# Patient Record
Sex: FEMALE | Race: WHITE | NOT HISPANIC OR LATINO | URBAN - METROPOLITAN AREA
[De-identification: names, ages, dates, MRNs, and addresses within clinical notes are randomized per-mention and may not be internally consistent; named-entity substitution may affect disease eponyms.]

---

## 2024-04-11 ENCOUNTER — HOSPITAL ENCOUNTER (OUTPATIENT)
Dept: RADIOLOGY | Facility: HOSPITAL | Age: 44
Discharge: HOME | End: 2024-04-11
Payer: COMMERCIAL

## 2024-04-11 DIAGNOSIS — Z12.31 ENCOUNTER FOR SCREENING MAMMOGRAM FOR BREAST CANCER: ICD-10-CM

## 2024-04-11 PROCEDURE — 77063 BREAST TOMOSYNTHESIS BI: CPT | Mod: TC

## 2024-04-11 PROCEDURE — 77063 BREAST TOMOSYNTHESIS BI: CPT | Mod: 26,,, | Performed by: RADIOLOGY

## 2024-04-11 PROCEDURE — 77067 SCR MAMMO BI INCL CAD: CPT | Mod: 26,,, | Performed by: RADIOLOGY

## 2024-05-29 ENCOUNTER — OFFICE VISIT (OUTPATIENT)
Dept: PRIMARY CARE CLINIC | Facility: CLINIC | Age: 44
End: 2024-05-29
Payer: COMMERCIAL

## 2024-05-29 VITALS
OXYGEN SATURATION: 99 % | RESPIRATION RATE: 18 BRPM | HEART RATE: 107 BPM | WEIGHT: 292.44 LBS | HEIGHT: 64 IN | SYSTOLIC BLOOD PRESSURE: 126 MMHG | DIASTOLIC BLOOD PRESSURE: 85 MMHG | BODY MASS INDEX: 49.93 KG/M2

## 2024-05-29 DIAGNOSIS — Z11.59 NEED FOR HEPATITIS C SCREENING TEST: ICD-10-CM

## 2024-05-29 DIAGNOSIS — Z76.89 ENCOUNTER TO ESTABLISH CARE: Primary | ICD-10-CM

## 2024-05-29 DIAGNOSIS — R35.89 POLYURIA: ICD-10-CM

## 2024-05-29 DIAGNOSIS — Z13.1 DIABETES MELLITUS SCREENING: ICD-10-CM

## 2024-05-29 DIAGNOSIS — Z11.4 ENCOUNTER FOR SCREENING FOR HIV: ICD-10-CM

## 2024-05-29 DIAGNOSIS — R06.83 SNORING: ICD-10-CM

## 2024-05-29 DIAGNOSIS — Z23 NEED FOR VACCINATION: ICD-10-CM

## 2024-05-29 DIAGNOSIS — Z51.81 MEDICATION MONITORING ENCOUNTER: ICD-10-CM

## 2024-05-29 DIAGNOSIS — R53.83 FATIGUE, UNSPECIFIED TYPE: ICD-10-CM

## 2024-05-29 DIAGNOSIS — Z13.6 ENCOUNTER FOR SCREENING FOR CARDIOVASCULAR DISORDERS: ICD-10-CM

## 2024-05-29 DIAGNOSIS — Z00.00 WELL WOMAN EXAM WITHOUT GYNECOLOGICAL EXAM: ICD-10-CM

## 2024-05-29 DIAGNOSIS — E55.9 VITAMIN D DEFICIENCY: ICD-10-CM

## 2024-05-29 PROCEDURE — 3074F SYST BP LT 130 MM HG: CPT | Mod: CPTII,S$GLB,, | Performed by: STUDENT IN AN ORGANIZED HEALTH CARE EDUCATION/TRAINING PROGRAM

## 2024-05-29 PROCEDURE — 90471 IMMUNIZATION ADMIN: CPT | Mod: S$GLB,,, | Performed by: STUDENT IN AN ORGANIZED HEALTH CARE EDUCATION/TRAINING PROGRAM

## 2024-05-29 PROCEDURE — 1160F RVW MEDS BY RX/DR IN RCRD: CPT | Mod: CPTII,S$GLB,, | Performed by: STUDENT IN AN ORGANIZED HEALTH CARE EDUCATION/TRAINING PROGRAM

## 2024-05-29 PROCEDURE — 3079F DIAST BP 80-89 MM HG: CPT | Mod: CPTII,S$GLB,, | Performed by: STUDENT IN AN ORGANIZED HEALTH CARE EDUCATION/TRAINING PROGRAM

## 2024-05-29 PROCEDURE — 99999 PR PBB SHADOW E&M-EST. PATIENT-LVL V: CPT | Mod: PBBFAC,,, | Performed by: STUDENT IN AN ORGANIZED HEALTH CARE EDUCATION/TRAINING PROGRAM

## 2024-05-29 PROCEDURE — 3008F BODY MASS INDEX DOCD: CPT | Mod: CPTII,S$GLB,, | Performed by: STUDENT IN AN ORGANIZED HEALTH CARE EDUCATION/TRAINING PROGRAM

## 2024-05-29 PROCEDURE — 90715 TDAP VACCINE 7 YRS/> IM: CPT | Mod: S$GLB,,, | Performed by: STUDENT IN AN ORGANIZED HEALTH CARE EDUCATION/TRAINING PROGRAM

## 2024-05-29 PROCEDURE — 99204 OFFICE O/P NEW MOD 45 MIN: CPT | Mod: 25,S$GLB,, | Performed by: STUDENT IN AN ORGANIZED HEALTH CARE EDUCATION/TRAINING PROGRAM

## 2024-05-29 PROCEDURE — 1159F MED LIST DOCD IN RCRD: CPT | Mod: CPTII,S$GLB,, | Performed by: STUDENT IN AN ORGANIZED HEALTH CARE EDUCATION/TRAINING PROGRAM

## 2024-05-29 NOTE — PROGRESS NOTES
Subjective:       Patient ID: Tona Zelaya is a 43 y.o. female.    Chief Complaint: Establish Care    HPI:  43 y.o. female presents to [facility] to establish care    Acute concerns?: Patient reports works as  and over past year was sick more often than usual. Also suffering with fatigue.    Onset?: ~ 1 year ago, when had first bout of PNA  Progression?: Persistent, waxes and wanes  Good hydration?: Yes  Sleep?: Broken, does have to urinate frequently, has been urinating frequently  Appetite?: Has been ok  Weight loss?: No  New Medications?: No  History of fatigue?: No    Patient feels she may snore when sleeping. Never told stops breathing. Never doses during day.        5/29/2024     3:27 PM   EPWORTH SLEEPINESS SCALE   Sitting and reading 2   Watching TV 2   Sitting, inactive in a public place (e.g. a theatre or a meeting) 1   As a passenger in a car for an hour without a break 3   Lying down to rest in the afternoon when circumstances permit 2   Sitting and talking to someone 0   Sitting quietly after a lunch without alcohol 1   In a car, while stopped for a few minutes in traffic 2   Total score 13         Last PCP?: Unknown  Allergies: NKDA  Medical History: None  Medications: None  Surgical History: None  Family History: Mother with breast cancer (unknown marker status); no known autoimmune disease  Social History: Never smoker, no EtOH, no illicits    Fasting?: No  Last blood work?: 2 years ago  Hep C/HIV screening?: Amenable  Pap Hx?: 2 years, unremarkable  Last tetanus vaccine?: Amenable    Review of Systems   Constitutional:  Positive for fatigue. Negative for appetite change, chills, diaphoresis, fever and unexpected weight change.   HENT:  Negative for congestion, sinus pressure, sneezing and sore throat.    Respiratory:  Negative for cough and shortness of breath.    Cardiovascular:  Negative for chest pain and palpitations.   Gastrointestinal:  Negative for abdominal pain,  "diarrhea, nausea and vomiting.   Endocrine: Positive for cold intolerance, polydipsia and polyuria. Negative for heat intolerance.   Musculoskeletal:  Negative for arthralgias, joint swelling and myalgias.   Skin:  Negative for rash and wound.   Neurological:  Negative for dizziness, light-headedness and headaches.       Objective:      Vitals:    05/29/24 1509   BP: 126/85   BP Location: Right arm   Patient Position: Sitting   BP Method: Large (Automatic)   Pulse: 107   Resp: 18   SpO2: 99%   Weight: 132.6 kg (292 lb 7 oz)   Height: 5' 4" (1.626 m)     Physical Exam  Vitals reviewed.   Constitutional:       General: She is not in acute distress.     Appearance: Normal appearance. She is not ill-appearing.   HENT:      Head: Normocephalic and atraumatic.   Eyes:      General:         Right eye: No discharge.         Left eye: No discharge.      Conjunctiva/sclera: Conjunctivae normal.   Neck:      Thyroid: No thyroid mass, thyromegaly or thyroid tenderness.   Cardiovascular:      Rate and Rhythm: Normal rate and regular rhythm.      Pulses: Normal pulses.      Heart sounds: Normal heart sounds. No murmur heard.  Pulmonary:      Effort: Pulmonary effort is normal.      Breath sounds: Normal breath sounds.   Musculoskeletal:         General: No deformity.      Cervical back: Neck supple. No rigidity.   Lymphadenopathy:      Cervical: No cervical adenopathy.   Skin:     General: Skin is warm and dry.      Coloration: Skin is not jaundiced.   Neurological:      General: No focal deficit present.      Mental Status: She is alert and oriented to person, place, and time.   Psychiatric:         Mood and Affect: Mood normal.         Behavior: Behavior normal.             No results found for: "NA", "K", "CL", "CO2", "BUN", "CREATININE", "GLUCOSE", "ANIONGAP"  No results found for: "HGBA1C"  No results found for: "BNP", "BNPTRIAGEBLO"    No results found for: "WBC", "HGB", "HCT", "PLT", "GRAN"  No results found for: "CHOL", " ""HDL", "LDLCALC", "TRIG"     No current outpatient medications on file.        Assessment:       1. Encounter to establish care    2. Medication monitoring encounter    3. Encounter for screening for cardiovascular disorders    4. Diabetes mellitus screening    5. Polyuria    6. Fatigue, unspecified type    7. Snoring    8. Vitamin D deficiency    9. Need for hepatitis C screening test    10. Encounter for screening for HIV    11. Well woman exam without gynecological exam    12. Need for vaccination           Plan:       Encounter to establish care  Medication monitoring encounter  -     CBC Auto Differential; Future; Expected date: 05/29/2024  -     Comprehensive Metabolic Panel; Future; Expected date: 05/29/2024    Encounter for screening for cardiovascular disorders  -     Lipid Panel; Future; Expected date: 05/29/2024    Diabetes mellitus screening  Polyuria  -     URINALYSIS; Future; Expected date: 05/29/2024  -     Hemoglobin A1C; Future; Expected date: 05/29/2024    Fatigue, unspecified type  Snoring  -     Lactate dehydrogenase (LDH); Future; Expected date: 05/29/2024  -     Uric acid; Future; Expected date: 05/29/2024  -     Sedimentation rate; Future; Expected date: 05/29/2024  -     TSH; Future; Expected date: 05/29/2024  -     T4, free; Future; Expected date: 05/29/2024  -     Ambulatory referral/consult to Sleep Disorders; Future; Expected date: 06/05/2024  - Hundred sleepiness scale 13. Will refer to Sleep Medicine for further evaluation  - Will order blood work to evaluate for possible organic cause    Vitamin D deficiency  -     Vitamin D; Future; Expected date: 05/29/2024    Need for hepatitis C screening test  -     Hepatitis C Antibody; Future; Expected date: 05/29/2024    Encounter for screening for HIV  -     HIV 1/2 Ag/Ab (4th Gen); Future; Expected date: 05/29/2024    Well woman exam without gynecological exam  -     Ambulatory referral/consult to Gynecology; Future; Expected date: " 06/05/2024    Need for vaccination  -     (In Office Administered) Tdap Vaccine    Recurrent illnesses possibly associated with NORA or elevated glucose. Awaiting labs  RTC PRN

## 2024-08-19 ENCOUNTER — OFFICE VISIT (OUTPATIENT)
Dept: SLEEP MEDICINE | Facility: CLINIC | Age: 44
End: 2024-08-19
Payer: COMMERCIAL

## 2024-08-19 VITALS
WEIGHT: 293 LBS | HEART RATE: 85 BPM | SYSTOLIC BLOOD PRESSURE: 155 MMHG | DIASTOLIC BLOOD PRESSURE: 92 MMHG | HEIGHT: 64 IN | BODY MASS INDEX: 50.02 KG/M2

## 2024-08-19 DIAGNOSIS — E55.9 VITAMIN D DEFICIENCY: ICD-10-CM

## 2024-08-19 DIAGNOSIS — G47.10 HYPERSOMNOLENCE: Primary | ICD-10-CM

## 2024-08-19 DIAGNOSIS — R53.83 FATIGUE, UNSPECIFIED TYPE: ICD-10-CM

## 2024-08-19 DIAGNOSIS — R06.83 SNORING: ICD-10-CM

## 2024-08-19 DIAGNOSIS — R35.1 NOCTURIA: ICD-10-CM

## 2024-08-19 DIAGNOSIS — F51.09 OTHER INSOMNIA NOT DUE TO A SUBSTANCE OR KNOWN PHYSIOLOGICAL CONDITION: ICD-10-CM

## 2024-08-19 PROCEDURE — 1159F MED LIST DOCD IN RCRD: CPT | Mod: CPTII,S$GLB,, | Performed by: PHYSICIAN ASSISTANT

## 2024-08-19 PROCEDURE — 99999 PR PBB SHADOW E&M-EST. PATIENT-LVL III: CPT | Mod: PBBFAC,,, | Performed by: PHYSICIAN ASSISTANT

## 2024-08-19 PROCEDURE — 1160F RVW MEDS BY RX/DR IN RCRD: CPT | Mod: CPTII,S$GLB,, | Performed by: PHYSICIAN ASSISTANT

## 2024-08-19 PROCEDURE — 99204 OFFICE O/P NEW MOD 45 MIN: CPT | Mod: S$GLB,,, | Performed by: PHYSICIAN ASSISTANT

## 2024-08-19 PROCEDURE — 3077F SYST BP >= 140 MM HG: CPT | Mod: CPTII,S$GLB,, | Performed by: PHYSICIAN ASSISTANT

## 2024-08-19 PROCEDURE — 3008F BODY MASS INDEX DOCD: CPT | Mod: CPTII,S$GLB,, | Performed by: PHYSICIAN ASSISTANT

## 2024-08-19 PROCEDURE — 3080F DIAST BP >= 90 MM HG: CPT | Mod: CPTII,S$GLB,, | Performed by: PHYSICIAN ASSISTANT

## 2024-08-19 PROCEDURE — 3044F HG A1C LEVEL LT 7.0%: CPT | Mod: CPTII,S$GLB,, | Performed by: PHYSICIAN ASSISTANT

## 2024-08-19 NOTE — PROGRESS NOTES
"Referred by Luis Azevedo MD     NEW PATIENT VISIT    Tona Zelaya  is a pleasant 43 y.o. female  with PMH significant for BMI 50+ who presents for sleep evaluation following referral from PCP      C/o snoring, poor disrupted and un-refreshing sleep, and excessive daytime sleepiness and fatigue. Reports recently dx with anemia and started on iron supplementation, but has not noticed and improvement in fatigue. States her PCP recommended evaluation for NORA, which is why she presents today    SLEEP SCHEDULE   Environment    Bed Time 9:30-11PM   Sleep Latency 5mins   Arousals 3   Nocturia 3   Back to sleep 5mins   Wake time 6AM work day  7-8AM off day   Naps Occasional nap   Work          8/16/2024     7:35 AM   Sleep Clinic ROS    Difficulty breathing through the nose?  Sometimes   Sore throat or dry mouth in the morning? Yes   Irregular or very fast heart beat?  Sometimes   Shortness of breath?  Sometimes   Acid reflux? Yes   Body aches and pains?  Sometimes   Morning headaches? Sometimes   Dizziness? Sometimes   Mood changes?  Sometimes   Do you exercise?  Yes   Do you feel like moving your legs a lot?  Yes       No past medical history on file.  There is no problem list on file for this patient.      Current Outpatient Medications:     ergocalciferol (ERGOCALCIFEROL) 50,000 unit Cap, Take 1 capsule (50,000 Units total) by mouth every 30 days., Disp: 3 capsule, Rfl: 3    ferrous sulfate (FEOSOL) 325 mg (65 mg iron) Tab tablet, Take 1 tablet (325 mg total) by mouth daily with breakfast., Disp: 90 tablet, Rfl: 3       Vitals:    08/19/24 1432   BP: (!) 155/92   BP Location: Left forearm   Patient Position: Sitting   BP Method: Medium (Automatic)   Pulse: 85   Weight: 134 kg (295 lb 6.7 oz)   Height: 5' 4" (1.626 m)     Physical Exam:    GEN:   Well-appearing  Psych:  Appropriate affect, demonstrates insight  SKIN:  No rash on the face or bridge of the nose      LABS:   Lab Results   Component Value Date    " HGB 11.0 (L) 05/30/2024    CO2 24 05/30/2024         RECORDS REVIEWED:    No previous sleep study    ASSESSMENT        8/16/2024     7:32 AM   EPWORTH SLEEPINESS SCALE   Sitting and reading 3   Watching TV 2   Sitting, inactive in a public place (e.g. a theatre or a meeting) 2   As a passenger in a car for an hour without a break 3   Lying down to rest in the afternoon when circumstances permit 2   Sitting and talking to someone 0   Sitting quietly after a lunch without alcohol 1   In a car, while stopped for a few minutes in traffic 1   Total score 14       PROBLEM DESCRIPTION/ Sx on Presentation  STATUS   Sx NORA   + snoring, denies witnessed apneas  + wakes feeling un-refreshed  New   Daytime Sx   + sleepiness when inactive   ESS 14/24 on intake  New   Insomnia   Trouble falling asleep: no issues  Arousals:         3 x nightly  Hard to get back to sleep?: no issues    Prior pertinent medications:  Current pertinent medications:   New   Nocturia   x 3 per sleep period  New   Other issues:       PLAN      -recommend sleep testing  -HST ordered  -discussed trial therapy if NORA present and the patient is open to a trial of CPAP therapy  -discussed NORA and PAP with patient in detail, including possible complications of untreated NORA like heart attack/stroke  -advised on strict driving precautions; advised never to drive drowsy  -follow up with PCP regarding elevated BP reading at today's visit; go to ER with new or worsening symptoms (i.e. chest pain, shortness of breath, headaches)    Advised on plan of care. Answered all patient questions. Patient verbalized understanding and voiced agreement with plan of care.     RTC if dx of NORA made and CPAP ordered, will need follow up 31-90 days after receiving machine for compliance         The patient was given open opportunity to ask questions and/or express concerns about treatment plan. All questions/concerns were discussed.     Two patient identifiers used prior to  evaluation.

## 2024-08-20 RX ORDER — ERGOCALCIFEROL 1.25 MG/1
50000 CAPSULE ORAL
Qty: 3 CAPSULE | Refills: 3 | Status: SHIPPED | OUTPATIENT
Start: 2024-08-20

## 2024-08-20 NOTE — TELEPHONE ENCOUNTER
No care due was identified.  [facility] Embedded Care Due Messages. Reference number: [id number].   8/19/2024 7:24:21 PM CDT

## 2024-08-23 ENCOUNTER — TELEPHONE (OUTPATIENT)
Dept: SLEEP MEDICINE | Facility: OTHER | Age: 44
End: 2024-08-23
Payer: COMMERCIAL

## 2024-08-26 ENCOUNTER — HOSPITAL ENCOUNTER (OUTPATIENT)
Dept: SLEEP MEDICINE | Facility: OTHER | Age: 44
Discharge: HOME | End: 2024-08-26
Attending: PHYSICIAN ASSISTANT
Payer: COMMERCIAL

## 2024-08-26 DIAGNOSIS — F51.09 OTHER INSOMNIA NOT DUE TO A SUBSTANCE OR KNOWN PHYSIOLOGICAL CONDITION: ICD-10-CM

## 2024-08-26 DIAGNOSIS — R35.1 NOCTURIA: ICD-10-CM

## 2024-08-26 DIAGNOSIS — G47.10 HYPERSOMNOLENCE: ICD-10-CM

## 2024-08-26 DIAGNOSIS — R53.83 FATIGUE, UNSPECIFIED TYPE: ICD-10-CM

## 2024-08-26 DIAGNOSIS — R06.83 SNORING: ICD-10-CM

## 2024-08-26 PROCEDURE — 95800 SLP STDY UNATTENDED: CPT

## 2024-08-27 PROBLEM — G47.10 HYPERSOMNOLENCE: Status: ACTIVE | Noted: 2024-08-27

## 2024-08-29 ENCOUNTER — PATIENT MESSAGE (OUTPATIENT)
Dept: SLEEP MEDICINE | Facility: CLINIC | Age: 44
End: 2024-08-29
Payer: COMMERCIAL

## 2024-09-19 ENCOUNTER — PATIENT MESSAGE (OUTPATIENT)
Dept: PRIMARY CARE CLINIC | Facility: CLINIC | Age: 44
End: 2024-09-19
Payer: COMMERCIAL

## 2024-11-06 ENCOUNTER — PATIENT OUTREACH (OUTPATIENT)
Dept: ADMINISTRATIVE | Facility: HOSPITAL | Age: 44
End: 2024-11-06
Payer: COMMERCIAL

## 2024-11-06 ENCOUNTER — PATIENT MESSAGE (OUTPATIENT)
Dept: ADMINISTRATIVE | Facility: HOSPITAL | Age: 44
End: 2024-11-06
Payer: COMMERCIAL

## 2024-11-06 DIAGNOSIS — Z12.11 SCREENING FOR COLON CANCER: Primary | ICD-10-CM

## 2024-11-06 NOTE — PROGRESS NOTES
Health Maintenance Due   Topic Date Due    Cervical Cancer Screening  Never done    Influenza Vaccine (1) 09/01/2024    COVID-19 Vaccine (4 - 2024-25 season) 09/01/2024    Colorectal Cancer Screening  Never done     Care Coordination Encounter Details:       [facility] Portal Status:         [x]  Reviewed MyChart Portal Status offered / enrolled if applicable        Additional Notes:     MyChart Outcomes: Pt is enrolled & active          Updates Requested / Reviewed:        Updated Care Coordination Note, Care Everywhere, External Sources: [facility] and [facility], and Immunizations Reconciliation Completed or Queried: [place]         Health Maintenance Screening(s) Due:      Health Maintenance Topics Overdue:      VBHM Score: 2     Cervical Cancer Screening  Colon Cancer Screening                 Health Maintenance Topic(s) Outreach Outcomes & Actions Taken:    Cervical Cancer Screening - Outreach Outcomes & Actions Taken  : Called for patient in regards to scheduling new GYN appointment, no answer. LM on . Portal message sent    Colorectal Cancer Screening - Outreach Outcomes & Actions Taken  : Colonoscopy Case Request / Referral / Home Test Order Placed. Order for Cologuard Kit placed        Additional Notes:             Chronic Disease Management:     Diabetes Measures        Lab Results   Component Value Date    HGBA1C 5.9 (H) 05/30/2024           [x]  Reviewed chart for active Diabetes diagnosis     []  Scheduled necessary follow up appointments if needed         Additional Notes:             Hypertension Measures        BP Readings from Last 1 Encounters:   08/19/24 (!) 155/92           [x]  Reviewed chart for active Hypertension diagnosis     []  Reviewed & documented Home BP Cuff     []  Documented a Remote BP if needed & applicable     []  Scheduled necessary follow up appointments with Primary Care if needed         Additional Notes:  Patient does not have HTN           Provider Team Continuity:     Last PCP  Visit Date: 5/29/2024          [x]  Reviewed Primary Care Provider Visits, Annual Wellness Visit, and Future          Appointments to ensure appointments have been scheduled and/or           completed        Additional Notes:  No future appointments scheduled with PCP            Social Determinants of Health          [x]  Reviewed, completed, and/or updated the following sections:                  Food Insecurity, Transportation Needs, Financial Resource Strain,                 Tobacco Use        Additional Notes:  Unable to assess as I did not talk with the patient           Care Management, Digital Medicine, and/or Education Referrals    OPCM Risk Score: 15         Next Steps - Referral Actions:          Additional Notes:

## 2025-03-18 ENCOUNTER — OFFICE VISIT (OUTPATIENT)
Dept: ORTHOPEDICS | Facility: CLINIC | Age: 45
End: 2025-03-18
Payer: COMMERCIAL

## 2025-03-18 VITALS
WEIGHT: 293 LBS | HEIGHT: 64 IN | HEART RATE: 96 BPM | DIASTOLIC BLOOD PRESSURE: 98 MMHG | BODY MASS INDEX: 50.02 KG/M2 | SYSTOLIC BLOOD PRESSURE: 149 MMHG

## 2025-03-18 DIAGNOSIS — M25.561 RIGHT KNEE PAIN, UNSPECIFIED CHRONICITY: Primary | ICD-10-CM

## 2025-03-18 DIAGNOSIS — M17.11 PRIMARY OSTEOARTHRITIS OF RIGHT KNEE: Primary | ICD-10-CM

## 2025-03-18 PROCEDURE — 3077F SYST BP >= 140 MM HG: CPT | Mod: CPTII,S$GLB,,

## 2025-03-18 PROCEDURE — 3080F DIAST BP >= 90 MM HG: CPT | Mod: CPTII,S$GLB,,

## 2025-03-18 PROCEDURE — 99999 PR PBB SHADOW E&M-EST. PATIENT-LVL III: CPT | Mod: PBBFAC,,,

## 2025-03-18 PROCEDURE — 1159F MED LIST DOCD IN RCRD: CPT | Mod: CPTII,S$GLB,,

## 2025-03-18 PROCEDURE — 99203 OFFICE O/P NEW LOW 30 MIN: CPT | Mod: S$GLB,,,

## 2025-03-18 PROCEDURE — 3008F BODY MASS INDEX DOCD: CPT | Mod: CPTII,S$GLB,,

## 2025-03-18 NOTE — PROGRESS NOTES
Patient ID: Tona Zelaya is a 44 y.o. female    CC: right knee pain    History of Present Illness:    Tona Zelaya presents to clinic for right knee pain. Patient denies known FIORELLA. Previously had left knee pain, saw external orthopedists and was told left knee arthritis. She tried PT for a few months with no improvement. She got an MRI and was found to have torn meniscus. She then tried gel injections to left knee with improvement back in December. Now she is having right knee pain, possibly from compensation. Also recently got back from a cruise and thinks this aggravated her right knee. The pain started 2 days ago and is becoming progressively worse.  Pain is located over (points to) anterior knee. She reports that the pain is a 5 /10 tight pain today. The pain is affecting ADLs and limiting desired level of activity. She does have some anterior knee numbness and her toes. This started this morning, possibly due to prolonged sitting.     She has tried voltaren gel and elevation/icing with improvement, but once she stood up pain returned. BMI 51.52 today.  She also previously tried steroid for her left knee before the gel injection and only got relief for a few weeks.  She is still receiving relief with the left knee gel injection.    Autoimmune conditions:No    DEXA Scan: N/A    Occupation: teacher    Ambulating: cane  Diabetic: no  Smoking: no  Hx of DVT/PE: no    PAST MEDICAL HISTORY: History reviewed. No pertinent past medical history.  PAST SURGICAL HISTORY: History reviewed. No pertinent surgical history.  FAMILY HISTORY:   Family History   Problem Relation Name Age of Onset    Breast cancer Mother Kimberly Zelaya     Arthritis Mother Kimberly Zelaya     Cancer Mother Kimberly Zelaya     Diabetes Mother Kimberly Zelaya      SOCIAL HISTORY:   Social History     Occupational History    Not on file   Tobacco Use    Smoking status: Never    Smokeless tobacco: Never   Substance and Sexual Activity     Alcohol use: Never    Drug use: Never    Sexual activity: Never        MEDICATIONS: Current Medications[1]  ALLERGIES:   Review of patient's allergies indicates:   Allergen Reactions    House dust Rash and Shortness Of Breath    Flowers Itching and Rash         Physical Exam     Vitals:    03/18/25 1128   BP: (!) 149/98   Pulse: 96     Alert and oriented to person, place and time. No acute distress. Well-groomed, not ill appearing. Pupils round and reactive, normal respiratory effort, no audible wheezing.     GENERAL:  A well-developed, well-nourished 44 y.o. female who is alert and       oriented in no acute distress.      Gait: She  walks with a normal gait.                   EXTREMITIES:  Examination of lower extremities reveals there is no visible mass or deformity.    Right knee:  ROM 0-110 * +crepitus    Ligamentously stable to varus/valgus stress.    Anterior and posterior drawers negative.    No pain over pes bursa.    No warmth    No erythema    Effusion No    medial joint line tenderness    Positive Patellofemoral grind/crepitus     The skin over both lower extremities is normal and unremarkable.  She has a  painless range of motion of the hips and ankles bilaterally.   Sensation is intact in both lower extremities.    There are no motor deficits in the lower extremities bilaterally.   Pedal pulses are palpable distally bilaterally.    She has no calf tenderness to palpation nor edema.    Imaging:     Bilateral Knee X-rays ordered/reviewed by me showing no evidence of fracture or dislocation. There is no obvious malalignment. No evidence of masses, lesions or foreign bodies. Moderate tricompartmental degenerative changes bilaterally. Kellgren lyle grade 2-3.     Assessment & Plan    Primary osteoarthritis of right knee  -     Prior authorization Order         I made the decision to obtain old records of the patient including previous notes and imaging. New imaging was ordered today of the extremity or  extremities evaluated. I independently reviewed and interpreted the radiographs and/or MRIs/CT scan today as well as prior imaging.    We discussed at length different treatment options including conservative vs surgical management. These include anti-inflammatories, acetaminophen, rest, ice, heat, formal physical therapy including strengthening and stretching exercises, home exercise programs, injections, dry needling, and finally surgical intervention.      Medical Necessity for viscosupplementation use: After thorough evaluation of the patient, I have determined that viscosupplementation treatment is medically necessary. The patient has painful degenerative joint disease (DJD) of the knee(s) with failure of conservative treatments including lifestyle modifications and rehabilitation exercises. Oral analgesics including NSAIDs have not adequately controlled the patient's symptoms. There is radiographic evidence of Kellgren-Matt grade II (or greater) osteoarthritic (OA) changes, or if lack of radiographic evidence, there is arthroscopic or other evidence of chondrosis of the knee(s).     Pre-authorization placed for right Synvisc-One injections.  Ice compress to the affected area 2-3x a day for 15-20 minutes as needed for pain management  NSAIDs PRN for pain management.   RTC to see Veronica pugh PA-C for Synvisc-One injections.      Follow up: synvisc-one  X-rays next visit: none    All questions were answered and patient is agreeable to the above plan.                    [1]   Current Outpatient Medications:     ergocalciferol (ERGOCALCIFEROL) 50,000 unit Cap, Take 1 capsule (50,000 Units total) by mouth every 30 days., Disp: 3 capsule, Rfl: 3    ferrous sulfate (FEOSOL) 325 mg (65 mg iron) Tab tablet, Take 1 tablet (325 mg total) by mouth daily with breakfast., Disp: 90 tablet, Rfl: 3

## 2025-03-20 DIAGNOSIS — M17.11 PRIMARY OSTEOARTHRITIS OF RIGHT KNEE: Primary | ICD-10-CM

## 2025-03-25 ENCOUNTER — OFFICE VISIT (OUTPATIENT)
Dept: ORTHOPEDICS | Facility: CLINIC | Age: 45
End: 2025-03-25
Payer: COMMERCIAL

## 2025-03-25 VITALS
HEIGHT: 64 IN | BODY MASS INDEX: 50.02 KG/M2 | HEART RATE: 74 BPM | WEIGHT: 293 LBS | DIASTOLIC BLOOD PRESSURE: 95 MMHG | SYSTOLIC BLOOD PRESSURE: 160 MMHG

## 2025-03-25 DIAGNOSIS — M17.11 PRIMARY OSTEOARTHRITIS OF RIGHT KNEE: Primary | ICD-10-CM

## 2025-03-25 PROCEDURE — 99499 UNLISTED E&M SERVICE: CPT | Mod: 25,S$GLB,,

## 2025-03-25 PROCEDURE — 99999 PR PBB SHADOW E&M-EST. PATIENT-LVL III: CPT | Mod: PBBFAC,,,

## 2025-03-25 PROCEDURE — 20610 DRAIN/INJ JOINT/BURSA W/O US: CPT | Mod: RT,S$GLB,,

## 2025-03-25 NOTE — PROCEDURES
Large Joint Aspiration/Injection: R knee    Date/Time: 3/25/2025 11:30 AM    Performed by: Veronica Lucio PA-C  Authorized by: Veronica Lucio PA-C    Consent Done?:  Yes (Verbal)  Indications:  Pain and arthritis  Site marked: the procedure site was marked    Timeout: prior to procedure the correct patient, procedure, and site was verified    Prep: patient was prepped and draped in usual sterile fashion      Local anesthesia used?: Yes    Local anesthetic:  Topical anesthetic    Details:  Needle Size:  21 G  Ultrasonic Guidance for needle placement?: No    Approach:  Anterolateral  Location:  Knee  Site:  R knee  Medications:  2 mL sodium hyaluronate (Euflexxa) solution 10 mg/mL  Patient tolerance:  Patient tolerated the procedure well with no immediate complications

## 2025-03-25 NOTE — PROGRESS NOTES
Tona Zelaya is here for follow up of right knee arthritis. Pt is requesting Euflexxa series injections #1 of 3.  [facility] reviewed per encounter record. Has failed other conservative modalities including NSAIDS, activity modification, weight loss.    The prior shot was tolerated well.    PHYSICAL EXAMINATION:     General: The patient is alert and oriented x 3. Mood is pleasant.   Observation of ears, eyes and nose reveals no gross abnormalities. No   labored breathing observed.     No signs of infection or adverse reaction to knee.    Medical Necessity for viscosupplementation use: After thorough evaluation of the patient, I have determined that viscosupplementation treatment is medically necessary. The patient has painful degenerative joint disease (DJD) of the knee(s) with failure of conservative treatments including lifestyle modifications and rehabilitation exercises. Oral analgesics including NSAIDs have not adequately controlled the patient's symptoms. There is radiographic evidence of Kellgren-Matt grade II (or greater) osteoarthritic (OA) changes, or if lack of radiographic evidence, there is arthroscopic or other evidence of chondrosis of the knee(s).     Injection Procedure  A time out was performed, including verification of patient ID, procedure, site and side, availability of information and equipment, review of safety issues, and agreement with consent, the procedure site was marked.    After time out was performed, the patient was prepped aseptically with chloraprep. A diagnostic and therapeutic injection of 2cc Euflexxa was given under sterile technique using a 21g x 1.5 needle from the anterolateral  aspect of the right Knee Joint in the sitting position.      Tona Zelaya had no adverse reactions to the medication. Pain decreased. She was instructed to apply ice to the joint for 20 minutes and avoid strenuous activities for 24-36 hours following the injection. She was warned of possible  blood sugar and/or blood pressure changes during that time. Following that time, she can resume regular activities.    She was reminded to call the clinic immediately for any adverse side effects as explained in clinic today.    RTC to see Veronica Lucio PA-C for Euflexxa series injections #2 of 3.    All of the patient's questions were answered and the patient will contact us if they have any questions or concerns in the interim.

## 2025-04-01 ENCOUNTER — OFFICE VISIT (OUTPATIENT)
Dept: ORTHOPEDICS | Facility: CLINIC | Age: 45
End: 2025-04-01
Payer: COMMERCIAL

## 2025-04-01 VITALS
DIASTOLIC BLOOD PRESSURE: 94 MMHG | WEIGHT: 293 LBS | HEART RATE: 76 BPM | BODY MASS INDEX: 50.02 KG/M2 | SYSTOLIC BLOOD PRESSURE: 157 MMHG | HEIGHT: 64 IN

## 2025-04-01 DIAGNOSIS — M17.11 PRIMARY OSTEOARTHRITIS OF RIGHT KNEE: Primary | ICD-10-CM

## 2025-04-01 PROCEDURE — 20610 DRAIN/INJ JOINT/BURSA W/O US: CPT | Mod: RT,S$GLB,,

## 2025-04-01 PROCEDURE — 99999 PR PBB SHADOW E&M-EST. PATIENT-LVL III: CPT | Mod: PBBFAC,,,

## 2025-04-01 PROCEDURE — 99499 UNLISTED E&M SERVICE: CPT | Mod: S$GLB,,,

## 2025-04-01 NOTE — PROCEDURES
Large Joint Aspiration/Injection: L knee    Date/Time: 4/1/2025 9:30 AM    Performed by: Veronica Lucio PA-C  Authorized by: Veronica Lucio PA-C    Consent Done?:  Yes (Verbal)  Indications:  Pain and arthritis  Site marked: the procedure site was marked    Timeout: prior to procedure the correct patient, procedure, and site was verified    Prep: patient was prepped and draped in usual sterile fashion    Local anesthetic:  Topical anesthetic    Details:  Needle Size:  21 G  Ultrasonic Guidance for needle placement?: No    Approach:  Anterolateral  Location:  Knee  Site:  L knee  Medications:  20 mg sodium hyaluronate (EUFLEXXA) 10 mg/mL injection ([place] formulary preferred)  Patient tolerance:  Patient tolerated the procedure well with no immediate complications

## 2025-04-01 NOTE — PROGRESS NOTES
Tona Zelaya is here for follow up of right knee arthritis. Pt is requesting Euflexxa series injections #2 of 3.  [facility] reviewed per encounter record. Has failed other conservative modalities including NSAIDS, activity modification, weight loss.    The prior shot was tolerated well.    PHYSICAL EXAMINATION:     General: The patient is alert and oriented x 3. Mood is pleasant.   Observation of ears, eyes and nose reveals no gross abnormalities. No   labored breathing observed.     No signs of infection or adverse reaction to knee.    Medical Necessity for viscosupplementation use: After thorough evaluation of the patient, I have determined that viscosupplementation treatment is medically necessary. The patient has painful degenerative joint disease (DJD) of the knee(s) with failure of conservative treatments including lifestyle modifications and rehabilitation exercises. Oral analgesics including NSAIDs have not adequately controlled the patient's symptoms. There is radiographic evidence of Kellgren-Matt grade II (or greater) osteoarthritic (OA) changes, or if lack of radiographic evidence, there is arthroscopic or other evidence of chondrosis of the knee(s).     Injection Procedure  A time out was performed, including verification of patient ID, procedure, site and side, availability of information and equipment, review of safety issues, and agreement with consent, the procedure site was marked.    After time out was performed, the patient was prepped aseptically with chloraprep. A diagnostic and therapeutic injection of 2cc Euflexxa was given under sterile technique using a 21g x 1.5 needle from the anterolateral  aspect of the right Knee Joint in the sitting position.      Tona Zelaya had no adverse reactions to the medication. Pain decreased. She was instructed to apply ice to the joint for 20 minutes and avoid strenuous activities for 24-36 hours following the injection. She was warned of possible  blood sugar and/or blood pressure changes during that time. Following that time, she can resume regular activities.    She was reminded to call the clinic immediately for any adverse side effects as explained in clinic today.    RTC to see Veronica Lucio PA-C for Euflexxa series injections #3 of 3.    All of the patient's questions were answered and the patient will contact us if they have any questions or concerns in the interim.

## 2025-04-08 ENCOUNTER — OFFICE VISIT (OUTPATIENT)
Dept: ORTHOPEDICS | Facility: CLINIC | Age: 45
End: 2025-04-08
Payer: COMMERCIAL

## 2025-04-08 VITALS — WEIGHT: 293 LBS | HEIGHT: 64 IN | BODY MASS INDEX: 50.02 KG/M2

## 2025-04-08 DIAGNOSIS — T78.40XA ALLERGIC REACTION, INITIAL ENCOUNTER: ICD-10-CM

## 2025-04-08 DIAGNOSIS — M17.11 PRIMARY OSTEOARTHRITIS OF RIGHT KNEE: Primary | ICD-10-CM

## 2025-04-08 PROCEDURE — 99499 UNLISTED E&M SERVICE: CPT | Mod: 25,S$GLB,,

## 2025-04-08 PROCEDURE — 99999 PR PBB SHADOW E&M-EST. PATIENT-LVL II: CPT | Mod: PBBFAC,,,

## 2025-04-08 RX ORDER — METHYLPREDNISOLONE 4 MG/1
TABLET ORAL
Qty: 21 EACH | Refills: 0 | Status: SHIPPED | OUTPATIENT
Start: 2025-04-08

## 2025-04-08 NOTE — PROGRESS NOTES
Patient returns today for Euflexxa 3. However about 2 days after the 2nd injection started to develop some swelling on the left side of her body.  She has significant left forearm swelling and left knee swelling.  She denies any other adverse effects or any associated injury.  We will not give Euflexxa #3 today due to possible adverse effect of swelling.  She was instructed to keep a close eye on her symptoms and if they change or worsen to contact PCP/ER.  Sent a Medrol Dosepak and she will follow up if this does not improve her knee pain.

## 2025-04-22 ENCOUNTER — OFFICE VISIT (OUTPATIENT)
Dept: ORTHOPEDICS | Facility: CLINIC | Age: 45
End: 2025-04-22
Payer: COMMERCIAL

## 2025-04-22 VITALS
HEART RATE: 86 BPM | BODY MASS INDEX: 50.02 KG/M2 | DIASTOLIC BLOOD PRESSURE: 94 MMHG | SYSTOLIC BLOOD PRESSURE: 160 MMHG | HEIGHT: 64 IN | WEIGHT: 293 LBS

## 2025-04-22 DIAGNOSIS — M70.51 PES ANSERINUS BURSITIS OF RIGHT KNEE: ICD-10-CM

## 2025-04-22 DIAGNOSIS — M17.12 PRIMARY OSTEOARTHRITIS OF LEFT KNEE: Primary | ICD-10-CM

## 2025-04-22 PROCEDURE — 99999 PR PBB SHADOW E&M-EST. PATIENT-LVL III: CPT | Mod: PBBFAC,,,

## 2025-04-22 RX ORDER — TRIAMCINOLONE ACETONIDE 40 MG/ML
40 INJECTION, SUSPENSION INTRA-ARTICULAR; INTRAMUSCULAR
Status: COMPLETED | OUTPATIENT
Start: 2025-04-22 | End: 2025-04-22

## 2025-04-22 RX ORDER — TRIAMCINOLONE ACETONIDE 40 MG/ML
40 INJECTION, SUSPENSION INTRA-ARTICULAR; INTRAMUSCULAR
Status: DISCONTINUED | OUTPATIENT
Start: 2025-04-22 | End: 2025-04-22 | Stop reason: HOSPADM

## 2025-04-22 RX ORDER — MELOXICAM 7.5 MG/1
7.5 TABLET ORAL DAILY
Qty: 30 TABLET | Refills: 1 | Status: SHIPPED | OUTPATIENT
Start: 2025-04-22

## 2025-04-22 RX ADMIN — TRIAMCINOLONE ACETONIDE 40 MG: 40 INJECTION, SUSPENSION INTRA-ARTICULAR; INTRAMUSCULAR at 10:04

## 2025-04-22 NOTE — PROCEDURES
Large Joint Aspiration/Injection: R anserine bursa    Date/Time: 4/22/2025 10:00 AM    Performed by: Veronica Lucio PA-C  Authorized by: Veronica Lucio PA-C    Consent Done?:  Yes (Verbal)  Indications:  Pain and arthritis  Site marked: the procedure site was marked    Timeout: prior to procedure the correct patient, procedure, and site was verified    Prep: patient was prepped and draped in usual sterile fashion      Local anesthesia used?: Yes    Local anesthetic:  Bupivacaine 0.25% without epinephrine and topical anesthetic  Anesthetic total (ml):  4      Details:  Needle Size:  21 G  Ultrasonic Guidance for needle placement?: No    Approach:  Anteromedial  Location:  Knee  Site:  R anserine bursa  Medications:  40 mg triamcinolone acetonide 40 mg/mL  Patient tolerance:  Patient tolerated the procedure well with no immediate complications

## 2025-04-22 NOTE — PROGRESS NOTES
Patient ID: Tona Zelaya is a 44 y.o. female    CC: right knee pain    History of Present Illness:    Tona Zelaya presents to clinic for right knee pain. Patient denies known FIORELLA. Previously had left knee pain, saw external orthopedists and was told left knee arthritis. She tried PT for a few months with no improvement. She got an MRI and was found to have torn meniscus. She then tried gel injections to left knee with improvement back in December. Now she is having right knee pain, possibly from compensation. Also recently got back from a cruise and thinks this aggravated her right knee. The pain started 2 days ago and is becoming progressively worse.  Pain is located over (points to) anterior knee. She reports that the pain is a 5 /10 tight pain today. The pain is affecting ADLs and limiting desired level of activity. She does have some anterior knee numbness and her toes. This started this morning, possibly due to prolonged sitting.     She has tried voltaren gel and elevation/icing with improvement, but once she stood up pain returned. BMI 51.52 today.  She also previously tried steroid for her left knee before the gel injection and only got relief for a few weeks.  She is still receiving relief with the left knee gel injection.    Autoimmune conditions:No    DEXA Scan: N/A    Occupation: teacher    Ambulating: cane  Diabetic: no  Smoking: no  Hx of DVT/PE: no    ____________________________________________________________________    Interval history 4/22/2025 : Patient returns today for follow up of right knee pain.  We trialed Euflexxa series however after the 2nd injection she started to have left-sided swelling is a very elected to not proceed with Euflexxa #3.  Reports she took the medrol dose pack, which helped initially. But still feeling swollen. She has been drinking water and taking po volatren bid. Also reports medial sided right knee pain and posterior left knee pain.  Most recent left knee  steroid was back in December at outside orthopedist.      PAST MEDICAL HISTORY: History reviewed. No pertinent past medical history.  PAST SURGICAL HISTORY: History reviewed. No pertinent surgical history.  FAMILY HISTORY:   Family History   Problem Relation Name Age of Onset    Breast cancer Mother Kimberly Zelaya     Arthritis Mother Kimberly Zelaya     Cancer Mother Kimberly Zelaya     Diabetes Mother Kimberly Zelaya      SOCIAL HISTORY:   Social History     Occupational History    Not on file   Tobacco Use    Smoking status: Never    Smokeless tobacco: Never   Substance and Sexual Activity    Alcohol use: Never    Drug use: Never    Sexual activity: Never        MEDICATIONS: Current Medications[1]  ALLERGIES:   Review of patient's allergies indicates:   Allergen Reactions    House dust Rash and Shortness Of Breath    Flowers Itching and Rash         Physical Exam     Vitals:    04/22/25 1021   BP: (!) 160/94   Pulse: 86       Alert and oriented to person, place and time. No acute distress. Well-groomed, not ill appearing. Pupils round and reactive, normal respiratory effort, no audible wheezing.     GENERAL:  A well-developed, well-nourished 44 y.o. female who is alert and       oriented in no acute distress.      Gait: She  walks with a normal gait.                   EXTREMITIES:  Examination of lower extremities reveals there is no visible mass or deformity.    Right knee:  ROM 0-110 * +crepitus    Ligamentously stable to varus/valgus stress.    Anterior and posterior drawers negative.    + TTP pes bursa.    No warmth    No erythema    Effusion No    medial joint line tenderness    Positive Patellofemoral grind/crepitus     Left knee:  ROM 0-110 * +crepitus    Ligamentously stable to varus/valgus stress.    Anterior and posterior drawers negative.    No pain over pes bursa.    No warmth    No erythema    Effusion No    medial joint line tenderness    Positive Patellofemoral grind/crepitus     The skin over  both lower extremities is normal and unremarkable.  She has a  painless range of motion of the hips and ankles bilaterally.   Sensation is intact in both lower extremities.    There are no motor deficits in the lower extremities bilaterally.   Pedal pulses are palpable distally bilaterally.    She has no calf tenderness to palpation nor edema.    Imaging:     Bilateral Knee X-rays ordered/reviewed by me showing no evidence of fracture or dislocation. There is no obvious malalignment. No evidence of masses, lesions or foreign bodies. Moderate tricompartmental degenerative changes bilaterally. Kellgren lyle grade 2-3.     Assessment & Plan    Primary osteoarthritis of left knee  -     meloxicam (MOBIC) 7.5 MG tablet; Take 1 tablet (7.5 mg total) by mouth once daily.  Dispense: 30 tablet; Refill: 1  -     triamcinolone acetonide injection 40 mg  -     Large Joint Aspiration/Injection: L knee    Pes anserinus bursitis of right knee  -     triamcinolone acetonide injection 40 mg  -     Large Joint Aspiration/Injection: R anserine bursa      Patient here for follow up of bilateral knee pain.  Trialed Euflexxa but she did have a possible allergic reaction.  Today she is tender over her right knee pes bursa.  We discussed trialing pes bursa injection in the right knee as well as intra-articular steroid injection in the left knee.  Injections given, post-injection instructions reviewed.  We will switch her anti-inflammatories to Mobic 7.5 mg.  May repeat injections every 3 months as needed, discussed trialing low impact activity    Mobic 7.5 mg-- May take 1-2 daily. Patient instructed to not take other NSAIDs with this. Patient instructed to take with food and OTC PPI, such as omeprazole to decrease GI side effects.     Follow up:  3 months or as needed  X-rays next visit: none    All questions were answered and patient is agreeable to the above plan.                      [1]   Current Outpatient Medications:      ergocalciferol (ERGOCALCIFEROL) 50,000 unit Cap, Take 1 capsule (50,000 Units total) by mouth every 30 days., Disp: 3 capsule, Rfl: 3    ferrous sulfate (FEOSOL) 325 mg (65 mg iron) Tab tablet, Take 1 tablet (325 mg total) by mouth daily with breakfast., Disp: 90 tablet, Rfl: 3    meloxicam (MOBIC) 7.5 MG tablet, Take 1 tablet (7.5 mg total) by mouth once daily., Disp: 30 tablet, Rfl: 1  No current facility-administered medications for this visit.

## 2025-04-22 NOTE — PROCEDURES
Large Joint Aspiration/Injection: L knee    Date/Time: 4/22/2025 10:00 AM    Performed by: Veronica Lucio PA-C  Authorized by: Veronica Lucio PA-C    Consent Done?:  Yes (Verbal)  Indications:  Pain and arthritis  Site marked: the procedure site was marked    Timeout: prior to procedure the correct patient, procedure, and site was verified    Prep: patient was prepped and draped in usual sterile fashion      Local anesthesia used?: Yes    Local anesthetic:  Bupivacaine 0.25% without epinephrine and topical anesthetic  Anesthetic total (ml):  4      Details:  Needle Size:  21 G  Ultrasonic Guidance for needle placement?: No    Approach:  Anterolateral  Location:  Knee  Site:  L knee  Medications:  40 mg triamcinolone acetonide 40 mg/mL  Patient tolerance:  Patient tolerated the procedure well with no immediate complications

## 2025-04-30 DIAGNOSIS — Z12.31 OTHER SCREENING MAMMOGRAM: ICD-10-CM

## 2025-05-05 ENCOUNTER — TELEPHONE (OUTPATIENT)
Dept: OBSTETRICS AND GYNECOLOGY | Facility: CLINIC | Age: 45
End: 2025-05-05
Payer: COMMERCIAL

## 2025-05-05 NOTE — TELEPHONE ENCOUNTER
Called and spoke with patient about appt .. Patient stated she was bleeding didn't know where she should come or not advise its up to her and whatever make her comfortable . She said she will come ..     ----- Message from Tierra sent at 5/5/2025  9:07 AM CDT -----  Regarding: advise  Pt called in stating she has a np visit with Dr. Pichardo tomorrow, for a problem she has been having (Frequent Periods) she states her menstrual has started again, and wanted to know if she should come in for her visit tomorrow. Could this pt get a call back?Mrn [id number] [phone]

## 2025-05-06 ENCOUNTER — OFFICE VISIT (OUTPATIENT)
Dept: OBSTETRICS AND GYNECOLOGY | Facility: CLINIC | Age: 45
End: 2025-05-06
Payer: COMMERCIAL

## 2025-05-06 VITALS
SYSTOLIC BLOOD PRESSURE: 134 MMHG | HEIGHT: 64 IN | BODY MASS INDEX: 50.02 KG/M2 | WEIGHT: 293 LBS | DIASTOLIC BLOOD PRESSURE: 98 MMHG

## 2025-05-06 DIAGNOSIS — N93.9 ABNORMAL UTERINE BLEEDING (AUB): Primary | ICD-10-CM

## 2025-05-06 DIAGNOSIS — R51.9 FREQUENT HEADACHES: ICD-10-CM

## 2025-05-06 PROCEDURE — 3080F DIAST BP >= 90 MM HG: CPT | Mod: CPTII,S$GLB,, | Performed by: OBSTETRICS & GYNECOLOGY

## 2025-05-06 PROCEDURE — 3075F SYST BP GE 130 - 139MM HG: CPT | Mod: CPTII,S$GLB,, | Performed by: OBSTETRICS & GYNECOLOGY

## 2025-05-06 PROCEDURE — 3008F BODY MASS INDEX DOCD: CPT | Mod: CPTII,S$GLB,, | Performed by: OBSTETRICS & GYNECOLOGY

## 2025-05-06 PROCEDURE — 99203 OFFICE O/P NEW LOW 30 MIN: CPT | Mod: S$GLB,,, | Performed by: OBSTETRICS & GYNECOLOGY

## 2025-05-06 PROCEDURE — 99999 PR PBB SHADOW E&M-EST. PATIENT-LVL III: CPT | Mod: PBBFAC,,, | Performed by: OBSTETRICS & GYNECOLOGY

## 2025-05-06 PROCEDURE — 1159F MED LIST DOCD IN RCRD: CPT | Mod: CPTII,S$GLB,, | Performed by: OBSTETRICS & GYNECOLOGY

## 2025-05-06 RX ORDER — SULFACETAMIDE SODIUM, SULFUR 100; 50 MG/G; MG/G
EMULSION TOPICAL
COMMUNITY
Start: 2025-03-24

## 2025-05-19 ENCOUNTER — PATIENT MESSAGE (OUTPATIENT)
Dept: OBSTETRICS AND GYNECOLOGY | Facility: CLINIC | Age: 45
End: 2025-05-19
Payer: COMMERCIAL

## 2025-05-22 ENCOUNTER — TELEPHONE (OUTPATIENT)
Dept: OBSTETRICS AND GYNECOLOGY | Facility: CLINIC | Age: 45
End: 2025-05-22
Payer: COMMERCIAL

## 2025-05-22 NOTE — TELEPHONE ENCOUNTER
Called and spoke with patient about bleeding she's having advised  may want to see her in office or virtual visit to discuss , patient understand advised ill send message over to provider

## 2025-05-26 ENCOUNTER — TELEPHONE (OUTPATIENT)
Dept: OBSTETRICS AND GYNECOLOGY | Facility: CLINIC | Age: 45
End: 2025-05-26
Payer: COMMERCIAL

## 2025-05-27 ENCOUNTER — OFFICE VISIT (OUTPATIENT)
Dept: OBSTETRICS AND GYNECOLOGY | Facility: CLINIC | Age: 45
End: 2025-05-27
Payer: COMMERCIAL

## 2025-05-27 DIAGNOSIS — N84.1 CERVICAL POLYP: Primary | ICD-10-CM

## 2025-05-27 DIAGNOSIS — N93.9 ABNORMAL UTERINE BLEEDING (AUB): ICD-10-CM

## 2025-05-27 PROCEDURE — 98006 SYNCH AUDIO-VIDEO EST MOD 30: CPT | Mod: 95,,, | Performed by: OBSTETRICS & GYNECOLOGY

## 2025-05-27 NOTE — PROGRESS NOTES
Digital Medicine: Virtual Visit      Patient Location: Home  Visit type: audiovisual  Present with the patient at the time of the consultation: None      Subjective:      HPI  45yo  presents to discuss irregular bleeding/spotting.  Up until Feb had been having normal cycles, then became irregular, and now bleeding is almost daily, sometimes light sometimes heavy.  Labs and US ordered last visit, scheduled for possible EMB and annual 6/10.  No other complaints today.  Not currently on any hormonal medication.      Review of Systems: Neg x 10, except as per HPI      Objective:      Physical Exam - limited due to virtual visit  APPEARANCE: Well nourished, well developed female in no acute distress.  RESPIRATORY: normal respiratory effort  NEURO: alert and oriented  PSYCH: normal mood and affect       Pelvic US:   FINDINGS:  The uterus measures 9.8 x 4.9 x 5.5 cm and has a homogeneous echotexture.  The endometrial stripe measures 0.3 cm.  The ovaries are not visualized.  There is minimal free fluid within the pelvis.  There is a 1.3 cm poorly defined isoechoic focus which appears to be within the cervix concerning for a polyp.  There are nabothian cysts.     Impression:     Findings highly concerning for cervical polyp.       Labs wnl.       Assessment:       Diagnoses and all orders for this visit:    Cervical polyp    Abnormal uterine bleeding (AUB)           Plan:       Discussed US with patient  - cervical polyp likely cause of irregular bleeding.  Discussed possible removal in office if visible on speculum exam vs hysteroscopy, D&C, with polypectomy in OR.  Desires to proceed with surgical intervention, in order to fully remove polyp.  Will plan annual/pap prior to surgery.  Discussed hormonal treatment to possibly help control bleeding, declines for now, but will let me know if bleeding is too bothersome and wants to start.   Counseling done, precautions given, all questions answered.  RTC for annual and  pre-op, or prn.           30 minutes of total time spent on the encounter, which includes face to face time and non-face to face time preparing to see the patient (eg, review of tests), Obtaining and/or reviewing separately obtained history, Documenting clinical information in the electronic or other health record, Independently interpreting results (not separately reported) and communicating results to the patient/family/caregiver, or Care coordination (not separately reported).         Each patient to whom he or she provides medical services by telemedicine is: (1) informed of the relationship between the physician and patient and the respective role of any other health care provider with respect to management of the patient; and (2) notified that he or she may decline to receive medical services by telemedicine and may withdraw from such care at any time.

## 2025-05-28 ENCOUNTER — HOSPITAL ENCOUNTER (OUTPATIENT)
Dept: RADIOLOGY | Facility: HOSPITAL | Age: 45
Discharge: HOME | End: 2025-05-28
Attending: STUDENT IN AN ORGANIZED HEALTH CARE EDUCATION/TRAINING PROGRAM
Payer: COMMERCIAL

## 2025-05-28 DIAGNOSIS — Z12.31 OTHER SCREENING MAMMOGRAM: ICD-10-CM

## 2025-05-28 PROCEDURE — 77067 SCR MAMMO BI INCL CAD: CPT | Mod: 26,,, | Performed by: RADIOLOGY

## 2025-05-28 PROCEDURE — 77063 BREAST TOMOSYNTHESIS BI: CPT | Mod: TC

## 2025-05-28 PROCEDURE — 77063 BREAST TOMOSYNTHESIS BI: CPT | Mod: 26,,, | Performed by: RADIOLOGY

## 2025-06-02 ENCOUNTER — RESULTS FOLLOW-UP (OUTPATIENT)
Dept: PRIMARY CARE CLINIC | Facility: CLINIC | Age: 45
End: 2025-06-02

## 2025-06-09 ENCOUNTER — TELEPHONE (OUTPATIENT)
Dept: OBSTETRICS AND GYNECOLOGY | Facility: CLINIC | Age: 45
End: 2025-06-09
Payer: COMMERCIAL

## 2025-06-09 NOTE — TELEPHONE ENCOUNTER
Called patient regarding call back request for not wanting to do procedure and wanting to do wwe only / preop Left a message for patient to call back   
no

## 2025-06-10 ENCOUNTER — PROCEDURE VISIT (OUTPATIENT)
Dept: OBSTETRICS AND GYNECOLOGY | Facility: CLINIC | Age: 45
End: 2025-06-10
Payer: COMMERCIAL

## 2025-06-10 VITALS
HEIGHT: 64 IN | HEART RATE: 82 BPM | SYSTOLIC BLOOD PRESSURE: 153 MMHG | BODY MASS INDEX: 50.02 KG/M2 | WEIGHT: 293 LBS | DIASTOLIC BLOOD PRESSURE: 98 MMHG

## 2025-06-10 DIAGNOSIS — Z12.4 SCREENING FOR CERVICAL CANCER: ICD-10-CM

## 2025-06-10 DIAGNOSIS — Z01.419 WELL WOMAN EXAM WITH ROUTINE GYNECOLOGICAL EXAM: Primary | ICD-10-CM

## 2025-06-10 DIAGNOSIS — N84.1 CERVICAL POLYP: ICD-10-CM

## 2025-06-10 PROCEDURE — 88175 CYTOPATH C/V AUTO FLUID REDO: CPT | Mod: TC | Performed by: OBSTETRICS & GYNECOLOGY

## 2025-06-10 PROCEDURE — 99396 PREV VISIT EST AGE 40-64: CPT | Mod: S$GLB,,, | Performed by: OBSTETRICS & GYNECOLOGY

## 2025-06-10 NOTE — PROGRESS NOTES
"Subjective     Patient ID: Tona Zelaya is a 44 y.o. female.    Chief Complaint:  Well Woman      History of Present Illness  HPI  44 y.o.  presents for annual exam.  Overall doing well. H/o cervical polyp causing irregular bleeding, previously discussed possible polypectomy in office if visualized vs hysteroscopy D&C.  Desires surgical intervention.  Continues with irregular bleeding today.  Mild cramping with bleeding.  Prior to Feb cycles regular, monthly.   No other complaints today.  Never been sexually active.  MMG up to date.      GYN & OB History  No LMP recorded.   Date of Last Pap: No result found    OB History    Para Term  AB Living   0 0 0      SAB IAB Ectopic Multiple Live Births              Review of Systems  Review of Systems   Constitutional:  Negative for chills and fever.   Respiratory:  Negative for shortness of breath.    Cardiovascular:  Negative for chest pain.   Gastrointestinal:  Negative for abdominal pain, constipation and diarrhea.   Genitourinary:  Positive for menstrual problem and vaginal bleeding. Negative for pelvic pain and vaginal discharge.   Musculoskeletal:  Negative for myalgias.   Neurological:  Negative for headaches.          Objective   Physical Exam    BP (!) 153/98 (Patient Position: Sitting)   Pulse 82   Ht 5' 4" (1.626 m)   Wt (!) 136.1 kg (300 lb 0.7 oz)   BMI 51.50 kg/m²     Gen: NAD  Resp: Normal respiratory effort  Breast: Symmetric, nontender.  No masses.  No skin changes.  No nipple discharge.   Abd: soft, NT  Pelvic: Normal-appearing external female genitalia.  No visible polyp.  Scant dark brown, old blood in vaginal vault, no heavy bleeding today.  Uterus small, mobile, nontender.  No adnexal masses or tenderness.    Ext: normal ROM  Psych: appropriate affect  Neuro: grossly intact         Assessment and Plan     Tona was seen today for well woman.    Diagnoses and all orders for this visit:    Well woman exam with routine " gynecological exam    Screening for cervical cancer  -     Liquid-Based Pap Smear, Screening  -     Liquid-Based Pap Smear, Screening    Cervical polyp  -     Case Request Operating Room: HYSTEROSCOPY, WITH DILATION AND CURETTAGE OF UTERUS          Plan:  Routine annual exam with pap smear and breast exam today.  Declined STD screening.  Discussed US and polyp - likely cause of irregular bleeding.  Not visible on exam today, will plan hysteroscopy D&C in OR.  R/B/A discussed, all questions answered.  Consents signed today.   Counseling done, precautions given, all questions answered.  RTC 2wks post-op.

## 2025-06-11 ENCOUNTER — PATIENT MESSAGE (OUTPATIENT)
Dept: PREADMISSION TESTING | Facility: OTHER | Age: 45
End: 2025-06-11
Payer: COMMERCIAL

## 2025-06-11 RX ORDER — MUPIROCIN 20 MG/G
OINTMENT TOPICAL
OUTPATIENT
Start: 2025-06-11

## 2025-06-11 RX ORDER — SODIUM CHLORIDE 9 MG/ML
INJECTION, SOLUTION INTRAVENOUS CONTINUOUS
OUTPATIENT
Start: 2025-06-11

## 2025-06-11 RX ORDER — FAMOTIDINE 20 MG/1
20 TABLET, FILM COATED ORAL
OUTPATIENT
Start: 2025-06-11

## 2025-06-12 NOTE — H&P
[facility] [address]  Obstetrics & Gynecology  History & Physical    Patient Name: Tona Zelaya  MRN: [id number]  Admission Date: (Not on file)  Primary Care Provider: Luis Azevedo MD    Subjective:     Chief Complaint/Reason for Admission:  Cervical polyp, irregular bleeding    History of Present Illness:   44 y.o.  with cervical polyp causing irregular bleeding, previously discussed possible polypectomy in office if visualized vs hysteroscopy D&C.  Desires surgical intervention.  Continues with irregular bleeding today.  Mild cramping with bleeding.  Prior to Feb cycles regular, monthly.   No other complaints today.  Never been sexually active.       Current Outpatient Medications on File Prior to Visit   Medication Sig    ergocalciferol (ERGOCALCIFEROL) 50,000 unit Cap Take 1 capsule (50,000 Units total) by mouth every 30 days.    ferrous sulfate (FEOSOL) 325 mg (65 mg iron) Tab tablet Take 1 tablet (325 mg total) by mouth daily with breakfast.    meloxicam (MOBIC) 7.5 MG tablet Take 1 tablet (7.5 mg total) by mouth once daily.    sulfacetamide sodium-sulfur 10-5 % (w/w) Clsr Apply topically.     No current facility-administered medications on file prior to visit.       Review of patient's allergies indicates:   Allergen Reactions    House dust Rash and Shortness Of Breath    Flowers Itching and Rash       No past medical history on file.  OB History    Para Term  AB Living   0 0 0      SAB IAB Ectopic Multiple Live Births            No past surgical history on file.  Family History       Problem Relation (Age of Onset)    Arthritis Mother    Breast cancer Mother    Cancer Mother    Diabetes Mother          Tobacco Use    Smoking status: Never    Smokeless tobacco: Never   Substance and Sexual Activity    Alcohol use: Never    Drug use: Never    Sexual activity: Never     Review of Systems  Constitutional:  Negative for chills and fever.   Respiratory:  Negative for shortness  of breath.    Cardiovascular:  Negative for chest pain.   Gastrointestinal:  Negative for abdominal pain, constipation and diarrhea.   Genitourinary:  Positive for menstrual problem and vaginal bleeding. Negative for pelvic pain and vaginal discharge.   Musculoskeletal:  Negative for myalgias.   Neurological:  Negative for headaches.     Objective:     Vital Signs (Most Recent):  Pulse: 82 (06/10/25 1430)  BP: (!) 153/98 (06/10/25 1430) Vital Signs (24h Range):  [unfilled]     Weight: (!) 136.1 kg (300 lb 0.7 oz)  Body mass index is 51.5 kg/m².  No LMP recorded.    Physical Exam  Gen: NAD  Resp: Normal respiratory effort  Breast: Symmetric, nontender.  No masses.  No skin changes.  No nipple discharge.   Abd: soft, NT  Pelvic: Normal-appearing external female genitalia.  No visible polyp.  Scant dark brown, old blood in vaginal vault, no heavy bleeding today.  Uterus small, mobile, nontender.  No adnexal masses or tenderness.    Ext: normal ROM  Psych: appropriate affect  Neuro: grossly intact       Laboratory:  FSH 5/21  Lab Results   Component Value Date    WBC 11.57 05/06/2025    HGB 13.4 05/06/2025    HCT 41.3 05/06/2025    MCV 90 05/06/2025     05/06/2025           Diagnostic Results:  Pelvic US:   The uterus measures 9.8 x 4.9 x 5.5 cm and has a homogeneous echotexture.  The endometrial stripe measures 0.3 cm.  The ovaries are not visualized.  There is minimal free fluid within the pelvis.  There is a 1.3 cm poorly defined isoechoic focus which appears to be within the cervix concerning for a polyp.  There are nabothian cysts.     Impression:     Findings highly concerning for cervical polyp.      Assessment/Plan:     45yo G0 with irregular bleeding due to cervical polyp.   Discussed US and polyp - likely cause of irregular bleeding.  Not visible on exam today, will plan hysteroscopy D&C in OR.  R/B/A discussed, all questions answered.  Consents signed today.   Counseling done, precautions given, all  questions answered.  RTC 2wks post-op.     Danielle Pichardo MD  Obstetrics & Gynecology  [facility] [address]

## 2025-06-12 NOTE — H&P (VIEW-ONLY)
[facility]  Obstetrics & Gynecology  History & Physical    Patient Name: Tona Zelaya  MRN: [id number]  Admission Date: (Not on file)  Primary Care Provider: Luis Azevedo MD    Subjective:     Chief Complaint/Reason for Admission:  Cervical polyp, irregular bleeding    History of Present Illness:   44 y.o.  with cervical polyp causing irregular bleeding, previously discussed possible polypectomy in office if visualized vs hysteroscopy D&C.  Desires surgical intervention.  Continues with irregular bleeding today.  Mild cramping with bleeding.  Prior to Feb cycles regular, monthly.   No other complaints today.  Never been sexually active.       Current Outpatient Medications on File Prior to Visit   Medication Sig    ergocalciferol (ERGOCALCIFEROL) 50,000 unit Cap Take 1 capsule (50,000 Units total) by mouth every 30 days.    ferrous sulfate (FEOSOL) 325 mg (65 mg iron) Tab tablet Take 1 tablet (325 mg total) by mouth daily with breakfast.    meloxicam (MOBIC) 7.5 MG tablet Take 1 tablet (7.5 mg total) by mouth once daily.    sulfacetamide sodium-sulfur 10-5 % (w/w) Clsr Apply topically.     No current facility-administered medications on file prior to visit.       Review of patient's allergies indicates:   Allergen Reactions    House dust Rash and Shortness Of Breath    Flowers Itching and Rash       No past medical history on file.  OB History    Para Term  AB Living   0 0 0      SAB IAB Ectopic Multiple Live Births            No past surgical history on file.  Family History       Problem Relation (Age of Onset)    Arthritis Mother    Breast cancer Mother    Cancer Mother    Diabetes Mother          Tobacco Use    Smoking status: Never    Smokeless tobacco: Never   Substance and Sexual Activity    Alcohol use: Never    Drug use: Never    Sexual activity: Never     Review of Systems  Constitutional:  Negative for chills and fever.   Respiratory:  Negative for shortness  of breath.    Cardiovascular:  Negative for chest pain.   Gastrointestinal:  Negative for abdominal pain, constipation and diarrhea.   Genitourinary:  Positive for menstrual problem and vaginal bleeding. Negative for pelvic pain and vaginal discharge.   Musculoskeletal:  Negative for myalgias.   Neurological:  Negative for headaches.     Objective:     Vital Signs (Most Recent):  Pulse: 82 (06/10/25 1430)  BP: (!) 153/98 (06/10/25 1430) Vital Signs (24h Range):  [unfilled]     Weight: (!) 136.1 kg (300 lb 0.7 oz)  Body mass index is 51.5 kg/m².  No LMP recorded.    Physical Exam  Gen: NAD  Resp: Normal respiratory effort  Breast: Symmetric, nontender.  No masses.  No skin changes.  No nipple discharge.   Abd: soft, NT  Pelvic: Normal-appearing external female genitalia.  No visible polyp.  Scant dark brown, old blood in vaginal vault, no heavy bleeding today.  Uterus small, mobile, nontender.  No adnexal masses or tenderness.    Ext: normal ROM  Psych: appropriate affect  Neuro: grossly intact       Laboratory:  [facility] 5/21  Lab Results   Component Value Date    WBC 11.57 05/06/2025    HGB 13.4 05/06/2025    HCT 41.3 05/06/2025    MCV 90 05/06/2025     05/06/2025           Diagnostic Results:  Pelvic US:   The uterus measures 9.8 x 4.9 x 5.5 cm and has a homogeneous echotexture.  The endometrial stripe measures 0.3 cm.  The ovaries are not visualized.  There is minimal free fluid within the pelvis.  There is a 1.3 cm poorly defined isoechoic focus which appears to be within the cervix concerning for a polyp.  There are nabothian cysts.     Impression:     Findings highly concerning for cervical polyp.      Assessment/Plan:     45yo G0 with irregular bleeding due to cervical polyp.   Discussed US and polyp - likely cause of irregular bleeding.  Not visible on exam today, will plan hysteroscopy D&C in OR.  R/B/A discussed, all questions answered.  Consents signed today.   Counseling done, precautions given, all  questions answered.  RTC 2wks post-op.     Danielle Pichardo MD  Obstetrics & Gynecology  [facility]

## 2025-06-13 LAB
INSULIN SERPL-ACNC: NORMAL U[IU]/ML
LAB AP BETHESDA CATEGORY: NORMAL
LAB AP CLINICAL FINDINGS: NORMAL
LAB AP CONTRACEPTIVES: NORMAL
LAB AP OCHS PAP SPECIMEN ADEQUACY: NORMAL
LAB AP OHS PAP INTERPRETATION: NORMAL
LAB AP PAP DISCLAIMER COMMENTS: NORMAL
LAB AP PAP ESTROGEN REPLACEMENT THERAPY: NORMAL
LAB AP PAP PMP: NORMAL
LAB AP PAP PREVIOUS BX: NORMAL
LAB AP PAP PRIOR TREATMENT: NORMAL
LAB AP PERFORMING LOCATION(S): NORMAL

## 2025-06-17 ENCOUNTER — RESULTS FOLLOW-UP (OUTPATIENT)
Dept: OBSTETRICS AND GYNECOLOGY | Facility: CLINIC | Age: 45
End: 2025-06-17

## 2025-06-20 ENCOUNTER — ANESTHESIA EVENT (OUTPATIENT)
Dept: SURGERY | Facility: OTHER | Age: 45
End: 2025-06-20
Payer: COMMERCIAL

## 2025-06-20 DIAGNOSIS — M17.12 PRIMARY OSTEOARTHRITIS OF LEFT KNEE: ICD-10-CM

## 2025-06-20 RX ORDER — MELOXICAM 7.5 MG/1
7.5 TABLET ORAL
Qty: 30 TABLET | Refills: 0 | Status: SHIPPED | OUTPATIENT
Start: 2025-06-20

## 2025-06-23 ENCOUNTER — HOSPITAL ENCOUNTER (OUTPATIENT)
Dept: PREADMISSION TESTING | Facility: OTHER | Age: 45
Discharge: HOME | End: 2025-06-23
Attending: OBSTETRICS & GYNECOLOGY
Payer: COMMERCIAL

## 2025-06-23 VITALS
RESPIRATION RATE: 18 BRPM | HEIGHT: 64 IN | DIASTOLIC BLOOD PRESSURE: 86 MMHG | OXYGEN SATURATION: 97 % | SYSTOLIC BLOOD PRESSURE: 159 MMHG | TEMPERATURE: 98 F | WEIGHT: 293 LBS | BODY MASS INDEX: 50.02 KG/M2 | HEART RATE: 68 BPM

## 2025-06-23 DIAGNOSIS — Z01.818 PREOP TESTING: Primary | ICD-10-CM

## 2025-06-23 LAB
ABSOLUTE EOSINOPHIL (OHS): 0.09 K/UL
ABSOLUTE MONOCYTE (OHS): 0.91 K/UL (ref 0.3–1)
ABSOLUTE NEUTROPHIL COUNT (OHS): 7.31 K/UL (ref 1.8–7.7)
ANION GAP (OHS): 10 MMOL/L (ref 8–16)
BASOPHILS # BLD AUTO: 0.06 K/UL
BASOPHILS NFR BLD AUTO: 0.6 %
BUN SERPL-MCNC: 12 MG/DL (ref 6–20)
CALCIUM SERPL-MCNC: 9.1 MG/DL (ref 8.7–10.5)
CHLORIDE SERPL-SCNC: 106 MMOL/L (ref 95–110)
CO2 SERPL-SCNC: 25 MMOL/L (ref 23–29)
CREAT SERPL-MCNC: 0.8 MG/DL (ref 0.5–1.4)
ERYTHROCYTE [DISTWIDTH] IN BLOOD BY AUTOMATED COUNT: 14.6 % (ref 11.5–14.5)
GFR SERPLBLD CREATININE-BSD FMLA CKD-EPI: >60 ML/MIN/1.73/M2
GLUCOSE SERPL-MCNC: 109 MG/DL (ref 70–110)
HCT VFR BLD AUTO: 40.9 % (ref 37–48.5)
HGB BLD-MCNC: 13.4 GM/DL (ref 12–16)
IMM GRANULOCYTES # BLD AUTO: 0.06 K/UL (ref 0–0.04)
IMM GRANULOCYTES NFR BLD AUTO: 0.6 % (ref 0–0.5)
LYMPHOCYTES # BLD AUTO: 2.37 K/UL (ref 1–4.8)
MCH RBC QN AUTO: 29.7 PG (ref 27–31)
MCHC RBC AUTO-ENTMCNC: 32.8 G/DL (ref 32–36)
MCV RBC AUTO: 91 FL (ref 82–98)
NUCLEATED RBC (/100WBC) (OHS): 0 /100 WBC
PLATELET # BLD AUTO: 263 K/UL (ref 150–450)
PMV BLD AUTO: 9.9 FL (ref 9.2–12.9)
POTASSIUM SERPL-SCNC: 4.5 MMOL/L (ref 3.5–5.1)
RBC # BLD AUTO: 4.51 M/UL (ref 4–5.4)
RELATIVE EOSINOPHIL (OHS): 0.8 %
RELATIVE LYMPHOCYTE (OHS): 21.9 % (ref 18–48)
RELATIVE MONOCYTE (OHS): 8.4 % (ref 4–15)
RELATIVE NEUTROPHIL (OHS): 67.7 % (ref 38–73)
SODIUM SERPL-SCNC: 141 MMOL/L (ref 136–145)
WBC # BLD AUTO: 10.8 K/UL (ref 3.9–12.7)

## 2025-06-23 PROCEDURE — 85025 COMPLETE CBC W/AUTO DIFF WBC: CPT

## 2025-06-23 PROCEDURE — 82310 ASSAY OF CALCIUM: CPT

## 2025-06-23 RX ORDER — SODIUM CHLORIDE, SODIUM LACTATE, POTASSIUM CHLORIDE, CALCIUM CHLORIDE 600; 310; 30; 20 MG/100ML; MG/100ML; MG/100ML; MG/100ML
INJECTION, SOLUTION INTRAVENOUS CONTINUOUS
Status: CANCELLED | OUTPATIENT
Start: 2025-06-23

## 2025-06-23 RX ORDER — LIDOCAINE HYDROCHLORIDE 10 MG/ML
0.5 INJECTION, SOLUTION EPIDURAL; INFILTRATION; INTRACAUDAL; PERINEURAL ONCE
Status: CANCELLED | OUTPATIENT
Start: 2025-06-23 | End: 2025-06-23

## 2025-06-23 RX ORDER — ACETAMINOPHEN 500 MG
1000 TABLET ORAL
Status: CANCELLED | OUTPATIENT
Start: 2025-06-23 | End: 2025-06-23

## 2025-06-23 NOTE — DISCHARGE INSTRUCTIONS
Information to Prepare you for your Surgery    PRE-ADMIT TESTING -  [phone]   -Jimena  [address] [place] [address] [facility] [address]  [facility]      Your surgery has been scheduled at [facility]. We are pleased to have the opportunity to serve you. For Further Information please call [phone].    On the day of surgery please report to the Information Desk on the 1st floor.    CONTACT YOUR PHYSICIAN'S OFFICE THE DAY PRIOR TO YOUR SURGERY TO OBTAIN YOUR ARRIVAL TIME.     The evening before surgery do not eat anything after 9 p.m. ( this includes hard candy, chewing gum and mints).  You may only have GATORADE, POWERADE AND WATER  from 9 p.m. until you leave your home.    DO NOT DRINK ANY LIQUIDS ON THE WAY TO THE HOSPITAL.      Why does your anesthesiologist allow you to drink Gatorade/Powerade before surgery?  Gatorade/Powerade helps to increase your comfort before surgery and to decrease your nausea after surgery. The carbohydrates in Gatorade/Powerade help reduce your body's stress response to surgery.      Outpatient Surgery- May allow 2 adult (18 and older) Support Persons (1 being the designated ) for all surgical/procedural patients. A breastfeeding mother will be allowed her infant and 2 adult Support Persons. No one under the age of 18 will be allowed in the building. No swapping out of visitors in the [facility].      SPECIAL MEDICATION INSTRUCTIONS: TAKE medications checked off on your Medication List.    Please bring your first morning urine in specimen cup the day of surgery.    Surgery Patients:    If you take ASPIRIN - Your PHYSICIAN/SURGEON will need to inform you IF/OR when you need to stop taking aspirin prior to your surgery.     The week prior to surgery do not ot take any medications containing IBUPROFEN or NSAIDS ( Advil, Motrin, Goodys, BC, Aleve, Naproxen,  Ketorolac, Meloxicam, Mobic,  Toradol,etc) If you are not sure if you should take a medicine please call your surgeon's office.  Ok to take Tylenol    Do Not Wear any make-up (especially eye make-up) to surgery. Please remove any false eyelashes or eyelash extensions. If you arrive the day of surgery with makeup/eyelashes on you will be required to remove prior to surgery. (There is a risk of corneal abrasions if eye makeup/eyelash extensions are not removed)      Leave all valuables at home.   Do Not wear any jewelry or watches, including any metal in body piercings. Jewelry must be removed prior to coming to the hospital.  There is a possibility that rings that are unable to be removed may be cut off if they are on the surgical extremity.    Please remove all hair extensions, wigs, clips and any other metal accessories/ ornaments from your hair.  These items may pose a flammable/fire risk in Surgery and must be removed.    Do not shave your surgical area at least 5 days prior to your surgery. The surgical prep will be performed at the hospital according to Infection Control regulations.    Contact Lens must be removed before surgery. Either do not wear the contact lens or bring a case and solution for storage.  Please bring a container for eyeglasses or dentures as required.  Bring any paperwork your physician has provided, such as consent forms,  history and physicals, doctor's orders, etc.   Bring comfortable clothes that are loose fitting to wear upon discharge. Take into consideration the type of surgery being performed.  Maintain your diet as advised per your physician the day prior to surgery.      Adequate rest the night before surgery is advised.   Park in the Parking lot behind the hospital or in the [facility] across the street from the parking lot. Parking is complimentary.  If you will be discharged the same day as your procedure, please arrange for a responsible adult to drive you home or to accompany you if traveling  by taxi.   YOU WILL NOT BE PERMITTED TO DRIVE OR TO LEAVE THE HOSPITAL ALONE AFTER SURGERY.   If you are being discharged the same day, it is strongly recommended that you arrange for someone to remain with you for the first 24 hrs following your surgery.    The Surgeon will speak to your family/visitor after your surgery regarding the outcome of your surgery and post op care.  The Surgeon may speak to you after your surgery, but there is a possibility you may not remember the details.  Please check with your family members regarding the conversation with the Surgeon.    We strongly recommend whoever is bringing you home be present for discharge instructions.  This will ensure a thorough understanding for your post op home care.    If the patient has fever, cough, or signs/symptoms of Flu or Covid please do not come in for your surgery. Contact your surgeon and your primary care physician for further instructions.           Thank you for your cooperation.  The Staff of [facility].            Bathing Instructions with Hibiclens or Dial Soap    Shower the evening before and morning of your procedure with Chlorhexidine (Hibiclens)  do not use Chlorhexidine on your face or genitals. Do not get in your eyes.  Wash your face with water and your regular face wash/soap  Use your regular shampoo  Apply Chlorhexidine (Hibiclens) directly on your skin or on a wet washcloth and wash gently. When showering: Move away from the shower stream when applying Chlorhexidine (Hibiclens) to avoid rinsing off too soon.  Rinse thoroughly with warm water  Do not dilute Chlorhexidine (Hibiclens)   Dry off as usual, do not use any deodorant, powder, body lotions, perfume, after shave or cologne.   Place clean sheets on bed day before surgery.    Thanks Jimena

## 2025-06-23 NOTE — ANESTHESIA PREPROCEDURE EVALUATION
06/22/2025  Tona Zelaya is a 44 y.o., female.      Pre-op Assessment    I have reviewed the Patient Summary Reports.     I have reviewed the Nursing Notes. I have reviewed the NPO Status.   I have reviewed the Medications.     Review of Systems  Anesthesia Hx:  No previous Anesthesia             Denies Family Hx of Anesthesia complications.    Denies Personal Hx of Anesthesia complications.                    Social:  Non-Smoker       Hematology/Oncology:  Hematology Normal   Oncology Normal                                   EENT/Dental:  EENT/Dental Normal           Cardiovascular:  Cardiovascular Normal                                              Pulmonary:        Sleep Apnea (Mild per sleep stdy)                Hepatic/GI:  Hepatic/GI Normal                    Musculoskeletal:  Arthritis               Neurological:  Neurology Normal                                      Endocrine:        Morbid Obesity / BMI > 40      Physical Exam  General: Well nourished and Cooperative    Airway:  Mallampati: III   Mouth Opening: Normal  TM Distance: 4 - 6 cm  Neck ROM: Normal ROM    Dental:  Intact    Anesthesia Plan  Type of Anesthesia, risks & benefits discussed:    Anesthesia Type: Gen ETT, Gen Supraglottic Airway  Intra-op Monitoring Plan: Standard ASA Monitors  Post Op Pain Control Plan: multimodal analgesia  Induction:  IV  Airway Plan: Video  Informed Consent: Informed consent signed with the Patient and all parties understand the risks and agree with anesthesia plan.  All questions answered.   ASA Score: 3  Day of Surgery Review of History & Physical: H&P Update referred to the surgeon/provider.  Anesthesia Plan Notes: CBC, BMP      Ready For Surgery From Anesthesia Perspective.     .

## 2025-06-27 ENCOUNTER — HOSPITAL ENCOUNTER (OUTPATIENT)
Facility: OTHER | Age: 45
Discharge: HOME | End: 2025-06-27
Attending: OBSTETRICS & GYNECOLOGY | Admitting: OBSTETRICS & GYNECOLOGY
Payer: COMMERCIAL

## 2025-06-27 ENCOUNTER — ANESTHESIA (OUTPATIENT)
Dept: SURGERY | Facility: OTHER | Age: 45
End: 2025-06-27
Payer: COMMERCIAL

## 2025-06-27 DIAGNOSIS — N84.1 CERVICAL POLYP: ICD-10-CM

## 2025-06-27 DIAGNOSIS — Z01.419 WELL WOMAN EXAM WITH ROUTINE GYNECOLOGICAL EXAM: ICD-10-CM

## 2025-06-27 DIAGNOSIS — Z98.890 STATUS POST HYSTEROSCOPY: Primary | ICD-10-CM

## 2025-06-27 LAB
B-HCG UR QL: NEGATIVE
CTP QC/QA: YES

## 2025-06-27 PROCEDURE — C1782 MORCELLATOR: HCPCS | Performed by: OBSTETRICS & GYNECOLOGY

## 2025-06-27 PROCEDURE — 88305 TISSUE EXAM BY PATHOLOGIST: CPT | Mod: 26,,, | Performed by: PATHOLOGY

## 2025-06-27 PROCEDURE — 71000015 HC POSTOP RECOV 1ST HR: Performed by: OBSTETRICS & GYNECOLOGY

## 2025-06-27 PROCEDURE — 88305 TISSUE EXAM BY PATHOLOGIST: CPT | Mod: TC | Performed by: OBSTETRICS & GYNECOLOGY

## 2025-06-27 PROCEDURE — 63600175 PHARM REV CODE 636 W HCPCS

## 2025-06-27 PROCEDURE — 81025 URINE PREGNANCY TEST: CPT

## 2025-06-27 PROCEDURE — 17110 DESTRUCTION B9 LES UP TO 14: CPT | Mod: 51,,, | Performed by: OBSTETRICS & GYNECOLOGY

## 2025-06-27 PROCEDURE — 58558 HYSTEROSCOPY BIOPSY: CPT | Mod: ,,, | Performed by: OBSTETRICS & GYNECOLOGY

## 2025-06-27 PROCEDURE — 25000003 PHARM REV CODE 250

## 2025-06-27 PROCEDURE — 25000003 PHARM REV CODE 250: Performed by: OBSTETRICS & GYNECOLOGY

## 2025-06-27 PROCEDURE — 71000033 HC RECOVERY, INTIAL HOUR: Performed by: OBSTETRICS & GYNECOLOGY

## 2025-06-27 PROCEDURE — 37000009 HC ANESTHESIA EA ADD 15 MINS: Performed by: OBSTETRICS & GYNECOLOGY

## 2025-06-27 PROCEDURE — 27201423 OPTIME MED/SURG SUP & DEVICES STERILE SUPPLY: Performed by: OBSTETRICS & GYNECOLOGY

## 2025-06-27 PROCEDURE — 63600175 PHARM REV CODE 636 W HCPCS: Performed by: STUDENT IN AN ORGANIZED HEALTH CARE EDUCATION/TRAINING PROGRAM

## 2025-06-27 PROCEDURE — 37000008 HC ANESTHESIA 1ST 15 MINUTES: Performed by: OBSTETRICS & GYNECOLOGY

## 2025-06-27 PROCEDURE — 71000016 HC POSTOP RECOV ADDL HR: Performed by: OBSTETRICS & GYNECOLOGY

## 2025-06-27 PROCEDURE — 25000003 PHARM REV CODE 250: Performed by: ANESTHESIOLOGY

## 2025-06-27 PROCEDURE — 36000706: Performed by: OBSTETRICS & GYNECOLOGY

## 2025-06-27 PROCEDURE — 36000707: Performed by: OBSTETRICS & GYNECOLOGY

## 2025-06-27 RX ORDER — ONDANSETRON HYDROCHLORIDE 2 MG/ML
INJECTION, SOLUTION INTRAVENOUS
Status: DISCONTINUED | OUTPATIENT
Start: 2025-06-27 | End: 2025-06-27

## 2025-06-27 RX ORDER — PROPOFOL 10 MG/ML
VIAL (ML) INTRAVENOUS
Status: DISCONTINUED | OUTPATIENT
Start: 2025-06-27 | End: 2025-06-27

## 2025-06-27 RX ORDER — ACETAMINOPHEN 500 MG
1000 TABLET ORAL EVERY 6 HOURS PRN
Qty: 30 TABLET | Refills: 2 | Status: SHIPPED | OUTPATIENT
Start: 2025-06-27

## 2025-06-27 RX ORDER — PROCHLORPERAZINE EDISYLATE 5 MG/ML
5 INJECTION INTRAMUSCULAR; INTRAVENOUS EVERY 30 MIN PRN
Status: DISCONTINUED | OUTPATIENT
Start: 2025-06-27 | End: 2025-06-27 | Stop reason: HOSPADM

## 2025-06-27 RX ORDER — GLUCAGON 1 MG
1 KIT INJECTION
Status: DISCONTINUED | OUTPATIENT
Start: 2025-06-27 | End: 2025-06-27 | Stop reason: HOSPADM

## 2025-06-27 RX ORDER — HYDROMORPHONE HYDROCHLORIDE 2 MG/ML
0.4 INJECTION, SOLUTION INTRAMUSCULAR; INTRAVENOUS; SUBCUTANEOUS EVERY 5 MIN PRN
Status: DISCONTINUED | OUTPATIENT
Start: 2025-06-27 | End: 2025-06-27 | Stop reason: HOSPADM

## 2025-06-27 RX ORDER — SODIUM CHLORIDE 9 MG/ML
INJECTION, SOLUTION INTRAVENOUS CONTINUOUS
Status: DISCONTINUED | OUTPATIENT
Start: 2025-06-27 | End: 2025-06-27 | Stop reason: HOSPADM

## 2025-06-27 RX ORDER — SODIUM CHLORIDE 0.9 % (FLUSH) 0.9 %
3 SYRINGE (ML) INJECTION
Status: DISCONTINUED | OUTPATIENT
Start: 2025-06-27 | End: 2025-06-27 | Stop reason: HOSPADM

## 2025-06-27 RX ORDER — ACETAMINOPHEN 500 MG
1000 TABLET ORAL EVERY 6 HOURS PRN
Status: DISCONTINUED | OUTPATIENT
Start: 2025-06-27 | End: 2025-06-27 | Stop reason: HOSPADM

## 2025-06-27 RX ORDER — KETOROLAC TROMETHAMINE 30 MG/ML
15 INJECTION, SOLUTION INTRAMUSCULAR; INTRAVENOUS EVERY 6 HOURS PRN
Status: DISCONTINUED | OUTPATIENT
Start: 2025-06-27 | End: 2025-06-27 | Stop reason: HOSPADM

## 2025-06-27 RX ORDER — LIDOCAINE HYDROCHLORIDE 20 MG/ML
INJECTION INTRAVENOUS
Status: DISCONTINUED | OUTPATIENT
Start: 2025-06-27 | End: 2025-06-27

## 2025-06-27 RX ORDER — LIDOCAINE HYDROCHLORIDE 10 MG/ML
0.5 INJECTION, SOLUTION EPIDURAL; INFILTRATION; INTRACAUDAL; PERINEURAL ONCE
Status: DISCONTINUED | OUTPATIENT
Start: 2025-06-27 | End: 2025-06-27 | Stop reason: HOSPADM

## 2025-06-27 RX ORDER — FENTANYL CITRATE 50 UG/ML
INJECTION, SOLUTION INTRAMUSCULAR; INTRAVENOUS
Status: DISCONTINUED | OUTPATIENT
Start: 2025-06-27 | End: 2025-06-27

## 2025-06-27 RX ORDER — ACETAMINOPHEN 500 MG
1000 TABLET ORAL
Status: COMPLETED | OUTPATIENT
Start: 2025-06-27 | End: 2025-06-27

## 2025-06-27 RX ORDER — SODIUM CHLORIDE, SODIUM LACTATE, POTASSIUM CHLORIDE, CALCIUM CHLORIDE 600; 310; 30; 20 MG/100ML; MG/100ML; MG/100ML; MG/100ML
INJECTION, SOLUTION INTRAVENOUS CONTINUOUS
Status: DISCONTINUED | OUTPATIENT
Start: 2025-06-27 | End: 2025-06-27 | Stop reason: HOSPADM

## 2025-06-27 RX ORDER — HYDROMORPHONE HYDROCHLORIDE 2 MG/ML
0.2 INJECTION, SOLUTION INTRAMUSCULAR; INTRAVENOUS; SUBCUTANEOUS
Status: DISCONTINUED | OUTPATIENT
Start: 2025-06-27 | End: 2025-06-27 | Stop reason: HOSPADM

## 2025-06-27 RX ORDER — KETOROLAC TROMETHAMINE 30 MG/ML
INJECTION, SOLUTION INTRAMUSCULAR; INTRAVENOUS
Status: DISCONTINUED | OUTPATIENT
Start: 2025-06-27 | End: 2025-06-27

## 2025-06-27 RX ORDER — SUCCINYLCHOLINE CHLORIDE 20 MG/ML
INJECTION INTRAMUSCULAR; INTRAVENOUS
Status: DISCONTINUED | OUTPATIENT
Start: 2025-06-27 | End: 2025-06-27

## 2025-06-27 RX ORDER — DEXAMETHASONE SODIUM PHOSPHATE 4 MG/ML
INJECTION, SOLUTION INTRA-ARTICULAR; INTRALESIONAL; INTRAMUSCULAR; INTRAVENOUS; SOFT TISSUE
Status: DISCONTINUED | OUTPATIENT
Start: 2025-06-27 | End: 2025-06-27

## 2025-06-27 RX ORDER — OXYCODONE HYDROCHLORIDE 5 MG/1
5 TABLET ORAL EVERY 4 HOURS PRN
Status: DISCONTINUED | OUTPATIENT
Start: 2025-06-27 | End: 2025-06-27 | Stop reason: HOSPADM

## 2025-06-27 RX ORDER — FAMOTIDINE 20 MG/1
20 TABLET, FILM COATED ORAL
Status: COMPLETED | OUTPATIENT
Start: 2025-06-27 | End: 2025-06-27

## 2025-06-27 RX ORDER — DIPHENHYDRAMINE HYDROCHLORIDE 50 MG/ML
12.5 INJECTION, SOLUTION INTRAMUSCULAR; INTRAVENOUS EVERY 30 MIN PRN
Status: DISCONTINUED | OUTPATIENT
Start: 2025-06-27 | End: 2025-06-27 | Stop reason: HOSPADM

## 2025-06-27 RX ORDER — ONDANSETRON HYDROCHLORIDE 2 MG/ML
4 INJECTION, SOLUTION INTRAVENOUS EVERY 4 HOURS PRN
Status: DISCONTINUED | OUTPATIENT
Start: 2025-06-27 | End: 2025-06-27 | Stop reason: HOSPADM

## 2025-06-27 RX ORDER — MUPIROCIN 20 MG/G
OINTMENT TOPICAL
Status: DISCONTINUED | OUTPATIENT
Start: 2025-06-27 | End: 2025-06-27 | Stop reason: HOSPADM

## 2025-06-27 RX ADMIN — DEXAMETHASONE SODIUM PHOSPHATE 8 MG: 4 INJECTION, SOLUTION INTRAMUSCULAR; INTRAVENOUS at 12:06

## 2025-06-27 RX ADMIN — PROPOFOL 200 MG: 10 INJECTION, EMULSION INTRAVENOUS at 12:06

## 2025-06-27 RX ADMIN — GLYCOPYRROLATE 0.2 MG: 0.2 INJECTION, SOLUTION INTRAMUSCULAR; INTRAVITREAL at 12:06

## 2025-06-27 RX ADMIN — ACETAMINOPHEN 1000 MG: 500 TABLET, FILM COATED ORAL at 11:06

## 2025-06-27 RX ADMIN — OXYCODONE HYDROCHLORIDE 5 MG: 5 TABLET ORAL at 01:06

## 2025-06-27 RX ADMIN — SUCCINYLCHOLINE CHLORIDE 200 MG: 20 INJECTION, SOLUTION INTRAMUSCULAR; INTRAVENOUS at 12:06

## 2025-06-27 RX ADMIN — PROPOFOL 50 MG: 10 INJECTION, EMULSION INTRAVENOUS at 12:06

## 2025-06-27 RX ADMIN — LIDOCAINE HYDROCHLORIDE 100 MG: 20 INJECTION, SOLUTION INTRAVENOUS at 12:06

## 2025-06-27 RX ADMIN — FAMOTIDINE 20 MG: 20 TABLET, FILM COATED ORAL at 11:06

## 2025-06-27 RX ADMIN — ONDANSETRON HYDROCHLORIDE 4 MG: 2 INJECTION INTRAMUSCULAR; INTRAVENOUS at 12:06

## 2025-06-27 RX ADMIN — SODIUM CHLORIDE, SODIUM LACTATE, POTASSIUM CHLORIDE, AND CALCIUM CHLORIDE: 600; 310; 30; 20 INJECTION, SOLUTION INTRAVENOUS at 12:06

## 2025-06-27 RX ADMIN — MUPIROCIN: 20 OINTMENT TOPICAL at 11:06

## 2025-06-27 RX ADMIN — KETOROLAC TROMETHAMINE 30 MG: 30 INJECTION, SOLUTION INTRAMUSCULAR; INTRAVENOUS at 12:06

## 2025-06-27 RX ADMIN — FENTANYL CITRATE 100 MCG: 50 INJECTION, SOLUTION INTRAMUSCULAR; INTRAVENOUS at 12:06

## 2025-06-27 NOTE — TRANSFER OF CARE
"Anesthesia Transfer of Care Note    Patient: Tona Zelaya    Procedure(s) Performed: Procedure(s) (LRB):  HYSTEROSCOPY, WITH DILATION AND CURETTAGE OF UTERUS (N/A)  EXCISION, CYST, LABIAL INCLUSION CYST BILATERAL (Bilateral)    Patient location: PACU    Anesthesia Type: general    Transport from OR: Transported from OR on 2-3 L/min O2 by NC with adequate spontaneous ventilation    Post pain: adequate analgesia    Post assessment: no apparent anesthetic complications and tolerated procedure well    Post vital signs: stable    Level of consciousness: awake, alert and oriented    Nausea/Vomiting: no nausea/vomiting    Complications: none    Transfer of care protocol was followed    Last vitals: Visit Vitals  /76   Pulse 69   Temp 36.7 °C (98.1 °F) (Oral)   Resp 18   Ht 5' 4" (1.626 m)   Wt 136.1 kg (300 lb)   LMP 05/02/2025 (Exact Date)   SpO2 97%   Breastfeeding No   BMI 51.49 kg/m²     "

## 2025-06-27 NOTE — PLAN OF CARE
Tona Hollyth Garcia has met all discharge criteria from Phase II. Vital Signs are stable, ambulating  without difficulty. Discharge instructions given, patient verbalized understanding. Discharged from facility via wheelchair in stable condition.

## 2025-06-27 NOTE — INTERVAL H&P NOTE
The patient has been examined and the H&P has been reviewed:    I concur with the findings and no changes have occurred since H&P was written.    Surgery risks, benefits and alternative options discussed and understood by patient/family.    Tona Zelaya is 44 y.o.  presenting for scheduled hysteroscopy D&C.    Temp:  [98.1 °F (36.7 °C)] 98.1 °F (36.7 °C)  Pulse:  [69] 69  Resp:  [18] 18  SpO2:  [97 %] 97 %  BP: (126)/(76) 126/76    General: NAD, alert, oriented, cooperative  HEENT: NCAT, EOM grossly intact  Lungs: Normal WOB  Heart: regular rate  Abdomen: soft, nondistended, nontender, no rebound or guarding    Consents in chart. All questions answered and concerns addressed. To OR for planned procedure.    Heidy Rodríguez MD  OB/GYN PGY-2

## 2025-06-27 NOTE — OP NOTE
OPERATIVE REPORT    06/27/2025     PREOPERATIVE DIAGNOSIS  1. Abnormal uterine bleeding  2. Cervical polyp    POSTOPERATIVE DIAGNOSIS  1-2. Same  3. Endometrial polyps  4. Bilateral labial inclusion cysts    PROCEDURE:  1. Hysteroscopic polypectomy  2. Dilation and curettage   3. Incision and drainage of bilateral labial inclusion cysts    SURGEON: Danielle Pichardo MD    ASSISTANT: Heidy Rodríguez MD - PGY2    ANESTHESIA: General    COMPLICATIONS: None    EBL: 10 cc    IV FLUIDS: See anesthesia report    URINE OUTPUT: Approximately 30 cc drained via in-and-out catheterization prior to procedure    Hysteroscopy Fluid Deficit: 30 cc normal saline    FINDINGS:  Normal appearing external genitalia and cervix. Bilateral labia majora scattered with several 5-10 mm inclusion cysts, incised and drained. Narrow vaginal introitus with intact hymen. Cervix did not descend with traction. Uterus sounded to 9 cm. Several endometrial and endocervical polyps visualized and resected using MyoSure Reach.        SPECIMENS:  1. Endometrial and endocervical polyps  2. Endometrial curettage    PROCEDURE:   Patient was taken to the operating room where general anesthesia was administered and found to be adequate.  She was placed in the dorsal lithotomy position using yellowfin stirrups then prepped and draped in the usual sterile fashion. Bladder was drained via in-and-out catheterization prior to procedure.  A surgical timeout was performed with patient's name, date of birth, procedure to be performed, and allergies verbalized. All OR staff in agreement. No preoperative antibiotics were administered as none were indicated.    Attention was then turned to the vagina where a lubricated bivalve speculum was placed.  The anterior lip of the cervix was grasped with a single tooth tenaculum.  The uterus was sounded to approximately 9 cm. The cervix was serially dilated to 18 Citizen of [place] using Pardeep dilators. The 5mm hysteroscope was advanced  through the cervical os and the uterus was distended with normal saline.  Endocervical polyps were visualized during insertion of hysteroscope. The endometrium was inspected with several endometrial polyps noted. The tubal ostia were identified without difficulty and appeared normal. The MyoSure Reach device was introduced into the uterine cavity via the hysteroscope and used to resect the endometrial polyps. The hysteroscope was slowly withdrawn to visualize the endocervical canal, and endocervical polyps were resected using the MyoSure Reach. A final survey of the uterine cavity and endocervical canal noted adequate resection of all polyps. The hysteroscope was withdrawn without difficulty.     The uterus was then curetted in a clockwise fashion until gritty feeling was noted in all aspects of the uterus. The endometrial scrapings were sent to pathology. The tenaculum was removed and hemostasis was noted at the puncture sites in the cervix. The speculum was removed, and slow bleeding was noted at the base of the hymen. Pressure was applied using a sponge stick with hemostasis noted.    Attention was then turned to the external genitalia where numerous simple appearing inclusion cysts were noted on the bilateral labia majora. The larger cysts were incised approximately 1 mm using an 11 blade scalpel and drained of sebaceous material. Hemostasis noted at incision sites.    Sponge and instrument counts were correct x2. The patient tolerated the procedure well and was awakened without difficulty. She was taken to the recovery room in stable condition.    Heidy Rodríguez MD  OB/GYN PGY-2

## 2025-06-27 NOTE — ANESTHESIA PROCEDURE NOTES
Intubation    Date/Time: 6/27/2025 12:10 PM    Performed by: Nemesio Johnson CRNA  Authorized by: Christopher Curry MD    Intubation:     Induction:  Rapid sequence induction    Intubated:  Postinduction    Mask Ventilation:  Not attempted    Attempts:  1    Attempted By:  CRNA    Method of Intubation:  Video laryngoscopy    Blade:  Alvarado 3    Laryngeal View Grade: Grade I - full view of cords      Difficult Airway Encountered?: No      Complications:  None    Airway Device:  Oral endotracheal tube    Airway Device Size:  7.0    Style/Cuff Inflation:  Cuffed (inflated to minimal occlusive pressure)    Tube secured:  22    Secured at:  The lips    Placement Verified By:  Capnometry    Complicating Factors:  Small mouth, obesity and oropharyngeal edema or fat    Findings Post-Intubation:  BS equal bilateral and atraumatic/condition of teeth unchanged

## 2025-06-27 NOTE — ANESTHESIA POSTPROCEDURE EVALUATION
Anesthesia Post Evaluation    Patient: Tona Zelaya    Procedure(s) Performed: Procedure(s) (LRB):  HYSTEROSCOPY, WITH DILATION AND CURETTAGE OF UTERUS (N/A)  EXCISION, CYST, LABIAL INCLUSION CYST BILATERAL (Bilateral)    Final Anesthesia Type: general      Patient location during evaluation: PACU  Patient participation: Yes- Able to Participate  Level of consciousness: awake and alert  Post-procedure vital signs: reviewed and stable  Pain management: adequate  Airway patency: patent    PONV status at discharge: No PONV  Anesthetic complications: no      Cardiovascular status: blood pressure returned to baseline  Respiratory status: unassisted  Hydration status: euvolemic  Follow-up not needed.              Vitals Value Taken Time   /74 06/27/25 14:11   Temp 37 °C (98.6 °F) 06/27/25 14:11   Pulse 88 06/27/25 14:11   Resp 18 06/27/25 14:11   SpO2 97 % 06/27/25 14:11         Event Time   Out of Recovery 14:07:15         Pain/Alejandrina Score: Pain Rating Prior to Med Admin: 4 (6/27/2025  1:45 PM)  Alejandrina Score: 10 (6/27/2025  2:11 PM)

## 2025-06-27 NOTE — DISCHARGE SUMMARY
Discharge Summary  Gynecology    Admit Date: 2025    Discharge Date and Time: 2025     Attending Physician: Danielle Pichardo MD    Principal Diagnoses:   Status post hysteroscopy    Active Hospital Problems    Diagnosis  POA    *Status post hysteroscopic polypectomy/D&C/drainage of labial inclusion cysts [Z98.890]  Not Applicable      Resolved Hospital Problems   No resolved problems to display.     Procedures:   Procedure(s) (LRB):  HYSTEROSCOPY, WITH DILATION AND CURETTAGE OF UTERUS (N/A)  EXCISION, CYST, LABIAL INCLUSION CYST BILATERAL (Bilateral)    Discharged Condition: good    Hospital Course:   Tona Zelaya is a 44 y.o.  female who presented on 2025 for the above-listed procedures for the treatment of AUB and endometrial/endocervical polyps. PMH notable for morbid obesity and osteoarthritis. Patient tolerated the procedure well and was admitted for post-operative care. Post-operative course was uncomplicated.    On day of discharge (POD#0), patient was in stable condition, having met all post-operative milestones. She was urinating spontaneously, ambulating, and tolerating a regular diet without nausea/vomiting. Pain was well-controlled on oral medication. She was discharged with medications and follow up as listed below.     Consults: None    Significant Diagnostic Studies:  Recent Labs   Lab 25  0802   WBC 10.80   HGB 13.4   HCT 40.9   MCV 91         Treatments:   1. Surgery as above    Disposition: Home or Self Care    Patient Instructions:   Current Discharge Medication List        START taking these medications    Details   acetaminophen (TYLENOL) 500 MG tablet Take 2 tablets (1,000 mg total) by mouth every 6 (six) hours as needed for Pain.  Qty: 30 tablet, Refills: 2           CONTINUE these medications which have NOT CHANGED    Details   ergocalciferol (ERGOCALCIFEROL) 50,000 unit Cap Take 1 capsule (50,000 Units total) by mouth every 30 days.  Qty:  3 capsule, Refills: 3    Associated Diagnoses: Vitamin D deficiency      ferrous sulfate (FEOSOL) 325 mg (65 mg iron) Tab tablet Take 1 tablet (325 mg total) by mouth daily with breakfast.  Qty: 90 tablet, Refills: 3    Associated Diagnoses: Iron deficiency anemia, unspecified iron deficiency anemia type      meloxicam (MOBIC) 7.5 MG tablet Take 1 tablet by mouth once daily  Qty: 30 tablet, Refills: 0    Associated Diagnoses: Primary osteoarthritis of left knee      sulfacetamide sodium-sulfur 10-5 % (w/w) Clsr Apply topically.      UNABLE TO FIND Apply topically. medication name: Azelaic Acid 15% Metronidazole 1% Ivermectin 1% cream           Discharge Procedure Orders   Diet general     Call MD for:  temperature >100.4     Call MD for:  persistent nausea and vomiting     Call MD for:  severe uncontrolled pain     Call MD for:  difficulty breathing, headache or visual disturbances     Call MD for:  redness, tenderness, or signs of infection (pain, swelling, redness, odor or green/yellow discharge around incision site)     Call MD for:  hives     Call MD for:   Order Comments: inability to void,urine is ketchup colored or you have large clots, vaginal bleeding is heavier than a period.    VAGINAL DISCHARGE: You may develop a vaginal discharge and intermittent vaginal spotting after surgery and up to 6 weeks postoperatively.  The discharge may have an odor and may change in color but it is normal.  This is due to dissolving stiches.  Contact your surgical team if you develop vaginal or vulvar irritation along with a discharge.  Also contact your surgical team if you have vaginal discharge that smells like urine or stool.    PAIN MEDICATIONS:     Take your pain medications as instructed. It is best to take pain medications before your pain becomes severe. This will allow you to take less medication yet have better pain relief. For the first 2 or 3 days it may be helpful to take your pain medications on a regular  schedule (e.g. every 4 to 6 hours). This will help you to keep your pain under better control. You should then begin to take fewer medications each day until you no longer need them. Do not take pain medication on an empty stomach. This may lead to nausea and vomiting.    CONSTIPATION REMEDIES: Patients are often constipated after surgery or with use of oral narcotic medicine. You should continue to take the stool softener, Senokot-S during the next six weeks, and consume adequate amounts of water.  If you have not had a bowel movement for 3 days after dismissal, or are uncomfortable and unable to pass stool, please try one or all of the following measures:  1.  Milk of Magnesia - 30 cc by mouth every 12 hours   2.  Dulcolax suppository - One suppository per rectum every 4-6 hours   3.  Metamucil, Fibercon or other bulk former - use as directed  4.  Fleets Enema  5.  Prunes or Prune juice    If you continue to have constipation after trying the above remedies, you should contact your surgical team using the contact information listed above     Lifting restrictions   Order Comments: PELVIC REST:  No douching, tampons, or intercourse for 2 weeks.  If prescribed, vaginal estrogen cream may be used during the postoperative period.     Activity as tolerated   Order Comments: Return to normal activity slowly as you feel able     Shower on day dressing removed (No bath)   Order Comments: You may shower on day of surgery, with assistance if needed. Avoid sitting in bathtubs, pools, hot tubs, etc. for two weeks.      Follow-up Information       Danielle Pichardo MD Follow up in 4 week(s).    Specialty: Obstetrics and Gynecology  Why: Post-op f/u appt  Contact information:  [address] [place] [address] [phone]                           Heidy Rodríguez MD  OB/GYN PGY-2

## 2025-06-28 VITALS
OXYGEN SATURATION: 97 % | DIASTOLIC BLOOD PRESSURE: 77 MMHG | TEMPERATURE: 99 F | BODY MASS INDEX: 50.02 KG/M2 | WEIGHT: 293 LBS | HEART RATE: 88 BPM | HEIGHT: 64 IN | SYSTOLIC BLOOD PRESSURE: 135 MMHG | RESPIRATION RATE: 18 BRPM

## 2025-06-30 ENCOUNTER — RESULTS FOLLOW-UP (OUTPATIENT)
Dept: OBSTETRICS AND GYNECOLOGY | Facility: CLINIC | Age: 45
End: 2025-06-30

## 2025-06-30 LAB
ESTROGEN SERPL-MCNC: NORMAL PG/ML
INSULIN SERPL-ACNC: NORMAL U[IU]/ML
LAB AP CLINICAL INFORMATION: NORMAL
LAB AP DIAGNOSIS CATEGORY: NORMAL
LAB AP GROSS DESCRIPTION: NORMAL
LAB AP PERFORMING LOCATION(S): NORMAL
LAB AP REPORT FOOTNOTES: NORMAL

## 2025-07-09 ENCOUNTER — OFFICE VISIT (OUTPATIENT)
Dept: OBSTETRICS AND GYNECOLOGY | Facility: CLINIC | Age: 45
End: 2025-07-09
Payer: COMMERCIAL

## 2025-07-09 ENCOUNTER — TELEPHONE (OUTPATIENT)
Dept: OBSTETRICS AND GYNECOLOGY | Facility: CLINIC | Age: 45
End: 2025-07-09
Payer: COMMERCIAL

## 2025-07-09 VITALS
BODY MASS INDEX: 50.02 KG/M2 | WEIGHT: 293 LBS | HEIGHT: 64 IN | DIASTOLIC BLOOD PRESSURE: 89 MMHG | SYSTOLIC BLOOD PRESSURE: 146 MMHG

## 2025-07-09 DIAGNOSIS — Z98.890 S/P D&C (STATUS POST DILATION AND CURETTAGE): Primary | ICD-10-CM

## 2025-07-09 DIAGNOSIS — N93.9 ABNORMAL UTERINE BLEEDING (AUB): ICD-10-CM

## 2025-07-09 DIAGNOSIS — N84.1 CERVICAL POLYP: ICD-10-CM

## 2025-07-09 PROCEDURE — 99999 PR PBB SHADOW E&M-EST. PATIENT-LVL III: CPT | Mod: PBBFAC,,, | Performed by: OBSTETRICS & GYNECOLOGY

## 2025-07-09 NOTE — PROGRESS NOTES
"Subjective     Patient ID: Tona Zelaya is a 44 y.o. female.    Chief Complaint:  Post-op Evaluation      History of Present Illness  HPI  44 y.o.  c/o vaginal bleeding, s/p hysteroscopy, D&C on  due to endometrial polyp.  She reports initially did well post-operatively, but started with period symptoms - HA 2 days ago, then started bleeding yesterday.  Last cycle prior to surgery -.  No other complaints today.  No dizziness/lightheadedness/SOB.  Not soaking through pad, but heavier than expected after just having D&C.      GYN & OB History  Patient's last menstrual period was 2025 (exact date).   Date of Last Pap: 2025    OB History    Para Term  AB Living   0 0 0      SAB IAB Ectopic Multiple Live Births              Review of Systems  Review of Systems   Constitutional:  Negative for chills and fever.   Respiratory:  Negative for shortness of breath.    Cardiovascular:  Negative for chest pain.   Gastrointestinal:  Negative for abdominal pain.   Genitourinary:  Positive for vaginal bleeding. Negative for pelvic pain and vaginal discharge.   Musculoskeletal:  Negative for myalgias.   Neurological:  Positive for headaches.          Objective   Physical Exam    BP (!) 146/89   Ht 5' 4" (1.626 m)   Wt 134.6 kg (296 lb 11.8 oz)   LMP 2025 (Exact Date) Comment: continual vb since 24  BMI 50.94 kg/m²     Gen: NAD  Resp: Normal respiratory effort  Pelvic: Normal-appearing external female genitalia.  Small amount of old blood in vault, no active bleeding.  No vaginal or cervical lacerations noted. Very uncomfortable exam for patient.   Ext: normal ROM  Psych: appropriate affect  Neuro: grossly intact    Pathology:   Final Diagnosis   1. Fragments of benign endometrial polyps and benign endocervical tissue  2. Small fragments of inactive endometrium and benign endocervical tissue all admixed with mucus and blood clot, no atypical hyperplasia or " malignancy identified.            Assessment and Plan     Tona was seen today for post-op evaluation.    Diagnoses and all orders for this visit:    S/P D&C (status post dilation and curettage)    Abnormal uterine bleeding (AUB)    Cervical polyp          Plan:  Overall stable from post-op standpoint.   Exam today reassuring - no vaginal lacerations causing bleeding.  Pathology and surgery reviewed, benign findings.   Will continue to monitor bleeding, may be normal cycle.   Counseling done, precautions given, all questions answered.  RTC 3-4 wks for f/u bleeding (virtual).

## 2025-07-09 NOTE — TELEPHONE ENCOUNTER
Called patient back due to call back request about bleeding patient is having after her surgery   Patient stated she's been bleeding week in a half on half off went 3 days no bleeding patient doesn't have fever or chills no pain just cramps       Copied from CRM #[id number]. Topic: General Inquiry - Patient Advice  >> Jul 8, 2025  1:06 PM Addy wrote:  Type: Patient Call Back    Who called: Self     What is the request in detail:pt called to speak to the provider about how she is feeling after her procedure. Stated that she started her cycle again and wanted to know if that is normal after the procedure. Would like a call back.     Can the clinic reply by [facility]? No     Would the patient rather a call back or a response via [facility]? Call back     Best call back number: [phone]     Additional Information:    Thank you.

## 2025-07-22 ENCOUNTER — OFFICE VISIT (OUTPATIENT)
Dept: PRIMARY CARE CLINIC | Facility: CLINIC | Age: 45
End: 2025-07-22
Payer: COMMERCIAL

## 2025-07-22 VITALS
TEMPERATURE: 98 F | HEART RATE: 83 BPM | WEIGHT: 293 LBS | OXYGEN SATURATION: 99 % | SYSTOLIC BLOOD PRESSURE: 132 MMHG | HEIGHT: 64 IN | BODY MASS INDEX: 50.02 KG/M2 | DIASTOLIC BLOOD PRESSURE: 78 MMHG

## 2025-07-22 DIAGNOSIS — Z51.81 MEDICATION MONITORING ENCOUNTER: ICD-10-CM

## 2025-07-22 DIAGNOSIS — Z00.00 ANNUAL PHYSICAL EXAM: Primary | ICD-10-CM

## 2025-07-22 PROCEDURE — 99999 PR PBB SHADOW E&M-EST. PATIENT-LVL IV: CPT | Mod: PBBFAC,,, | Performed by: STUDENT IN AN ORGANIZED HEALTH CARE EDUCATION/TRAINING PROGRAM

## 2025-07-22 PROCEDURE — 3075F SYST BP GE 130 - 139MM HG: CPT | Mod: CPTII,S$GLB,, | Performed by: STUDENT IN AN ORGANIZED HEALTH CARE EDUCATION/TRAINING PROGRAM

## 2025-07-22 PROCEDURE — 1159F MED LIST DOCD IN RCRD: CPT | Mod: CPTII,S$GLB,, | Performed by: STUDENT IN AN ORGANIZED HEALTH CARE EDUCATION/TRAINING PROGRAM

## 2025-07-22 PROCEDURE — 99396 PREV VISIT EST AGE 40-64: CPT | Mod: S$GLB,,, | Performed by: STUDENT IN AN ORGANIZED HEALTH CARE EDUCATION/TRAINING PROGRAM

## 2025-07-22 PROCEDURE — 3078F DIAST BP <80 MM HG: CPT | Mod: CPTII,S$GLB,, | Performed by: STUDENT IN AN ORGANIZED HEALTH CARE EDUCATION/TRAINING PROGRAM

## 2025-07-22 PROCEDURE — 3008F BODY MASS INDEX DOCD: CPT | Mod: CPTII,S$GLB,, | Performed by: STUDENT IN AN ORGANIZED HEALTH CARE EDUCATION/TRAINING PROGRAM

## 2025-07-22 PROCEDURE — 1160F RVW MEDS BY RX/DR IN RCRD: CPT | Mod: CPTII,S$GLB,, | Performed by: STUDENT IN AN ORGANIZED HEALTH CARE EDUCATION/TRAINING PROGRAM

## 2025-07-22 NOTE — PROGRESS NOTES
"Subjective:       Patient ID: Tona Zelaya is a 44 y.o. female.    Chief Complaint: Annual Exam    HPI:  44 y.o. female presents to [facility] here for annual visit    Acute concerns?: No acute concerns today, here for annual exam    Diet?: Has been following antiinflammatory diet. With torn meniscus  Exercise?: Walking, limited with knee. Chair yoga    Last blood work 6/23/2025 reviewed.    Review of Systems   Constitutional:  Negative for chills, diaphoresis, fatigue and fever.   HENT:  Negative for congestion, sinus pressure, sneezing and sore throat.    Respiratory:  Negative for cough and shortness of breath.    Cardiovascular:  Negative for chest pain and palpitations.   Gastrointestinal:  Negative for abdominal pain, diarrhea, nausea and vomiting.   Musculoskeletal:  Positive for arthralgias (Left knee with torn meniscus) and joint swelling (Left knee). Negative for myalgias.   Skin:  Negative for rash and wound.   Neurological:  Negative for weakness and numbness.       Objective:      Vitals:    07/22/25 0830   BP: 132/78   BP Location: Right arm   Patient Position: Sitting   Pulse: 83   Temp: 98 °F (36.7 °C)   TempSrc: Oral   SpO2: 99%   Weight: 134 kg (295 lb 4.9 oz)   Height: 5' 4" (1.626 m)     Physical Exam  Vitals reviewed.   Constitutional:       General: She is not in acute distress.     Appearance: Normal appearance. She is not ill-appearing.   HENT:      Head: Normocephalic and atraumatic.   Eyes:      General:         Right eye: No discharge.         Left eye: No discharge.      Conjunctiva/sclera: Conjunctivae normal.   Cardiovascular:      Rate and Rhythm: Normal rate and regular rhythm.      Pulses: Normal pulses.      Heart sounds: No murmur heard.  Pulmonary:      Effort: Pulmonary effort is normal.      Breath sounds: Normal breath sounds.   Musculoskeletal:         General: No deformity.      Cervical back: Neck supple. No rigidity.   Lymphadenopathy:      Cervical: No " "cervical adenopathy.   Skin:     General: Skin is warm and dry.      Coloration: Skin is not jaundiced.   Neurological:      General: No focal deficit present.      Mental Status: She is alert and oriented to person, place, and time.   Psychiatric:         Mood and Affect: Mood normal.         Behavior: Behavior normal.             Lab Results   Component Value Date     06/23/2025     05/30/2024    K 4.5 06/23/2025    K 4.0 05/30/2024     06/23/2025     05/30/2024    CO2 25 06/23/2025    CO2 24 05/30/2024    BUN 12 06/23/2025    CREATININE 0.8 06/23/2025    ANIONGAP 10 06/23/2025     Lab Results   Component Value Date    HGBA1C 5.9 (H) 05/30/2024     No results found for: "BNP", "BNPTRIAGEBLO"    Lab Results   Component Value Date    WBC 10.80 06/23/2025    HGB 13.4 06/23/2025    HGB 11.0 (L) 05/30/2024    HCT 40.9 06/23/2025    HCT 36.5 (L) 05/30/2024     06/23/2025     05/30/2024    GRAN 7.4 05/30/2024    GRAN 64.4 05/30/2024     Lab Results   Component Value Date    CHOL 181 05/30/2024    HDL 34 (L) 05/30/2024    LDLCALC 124.2 05/30/2024    TRIG 114 05/30/2024        Current Medications[1]        Assessment:       1. Annual physical exam    2. Medication monitoring encounter           Plan:       Annual physical exam  Medication monitoring encounter  - Health maintenance items reviewed today's visit  - Diet and exercise guidance provided today's visit  - Labs reviewed 6/23/2025    RTC 1 year         [1]   Current Outpatient Medications:     ergocalciferol (ERGOCALCIFEROL) 50,000 unit Cap, Take 1 capsule (50,000 Units total) by mouth every 30 days., Disp: 3 capsule, Rfl: 3    ferrous sulfate (FEOSOL) 325 mg (65 mg iron) Tab tablet, Take 1 tablet (325 mg total) by mouth daily with breakfast., Disp: 90 tablet, Rfl: 3    meloxicam (MOBIC) 7.5 MG tablet, Take 1 tablet by mouth once daily, Disp: 30 tablet, Rfl: 0    sulfacetamide sodium-sulfur 10-5 % (w/w) Clsr, Apply topically., " Disp: , Rfl:     UNABLE TO FIND, Apply topically. medication name: Azelaic Acid 15% Metronidazole 1% Ivermectin 1% cream, Disp: , Rfl:     acetaminophen (TYLENOL) 500 MG tablet, Take 2 tablets (1,000 mg total) by mouth every 6 (six) hours as needed for Pain. (Patient not taking: Reported on 7/22/2025), Disp: 30 tablet, Rfl: 2

## 2025-07-23 DIAGNOSIS — M17.12 PRIMARY OSTEOARTHRITIS OF LEFT KNEE: ICD-10-CM

## 2025-07-23 RX ORDER — MELOXICAM 7.5 MG/1
7.5 TABLET ORAL
Qty: 30 TABLET | Refills: 0 | Status: SHIPPED | OUTPATIENT
Start: 2025-07-23

## 2025-07-24 ENCOUNTER — OFFICE VISIT (OUTPATIENT)
Dept: NEUROLOGY | Facility: CLINIC | Age: 45
End: 2025-07-24
Payer: COMMERCIAL

## 2025-07-24 VITALS
DIASTOLIC BLOOD PRESSURE: 83 MMHG | WEIGHT: 293 LBS | HEIGHT: 64 IN | SYSTOLIC BLOOD PRESSURE: 137 MMHG | BODY MASS INDEX: 50.02 KG/M2 | HEART RATE: 71 BPM

## 2025-07-24 DIAGNOSIS — H53.8 BLURRY VISION, BILATERAL: ICD-10-CM

## 2025-07-24 DIAGNOSIS — G43.019 INTRACTABLE MIGRAINE WITHOUT AURA AND WITHOUT STATUS MIGRAINOSUS: Primary | ICD-10-CM

## 2025-07-24 DIAGNOSIS — R51.0 POSTURAL HEADACHE: ICD-10-CM

## 2025-07-24 DIAGNOSIS — R51.9 WORSENING HEADACHES: ICD-10-CM

## 2025-07-24 PROCEDURE — 99999 PR PBB SHADOW E&M-EST. PATIENT-LVL III: CPT | Mod: PBBFAC,,, | Performed by: STUDENT IN AN ORGANIZED HEALTH CARE EDUCATION/TRAINING PROGRAM

## 2025-07-24 RX ORDER — NARATRIPTAN 2.5 MG/1
TABLET ORAL
Qty: 9 TABLET | Refills: 11 | Status: SHIPPED | OUTPATIENT
Start: 2025-07-24 | End: 2025-08-23

## 2025-07-24 RX ORDER — LORAZEPAM 1 MG/1
TABLET ORAL
Qty: 2 TABLET | Refills: 0 | Status: SHIPPED | OUTPATIENT
Start: 2025-07-24

## 2025-07-24 NOTE — PROGRESS NOTES
Patient ID: [id number]  Referring Physician: Danielle Pichardo MD    Chief Complaint/Reason for Consult: Headaches  Subjective:     HPI:  Tona Zelaya is a 44 y.o. RH female who  has a past medical history of Arthritis, Cervical polyp, and Wears prescription eyeglasses. she is presenting today as a new patient for evaluation of worsening headaches.     Headache history  Age at onset: 1 year ago, one period in grad school  Course over time: increasing in both severity and frequency  Location:  frontal and temples  Character: pounding, sharp, and throbbing  Intensity: 7 on a scale from 1 to 10  Frequency: up to x4 per month.   Duration: several hours  Timing: are usually worse first thing in the morning, wakes her up in the middle of the night  Mild/moderate/severe. Work attendance or other daily activities are affected by the headaches.  Aura: not preceded by an aura  Associated symptoms: N/V, Photosensitivity, and Phonophobia   Associated neurologic symptoms: dizziness and vision problems, difficulty with concentration  Precipitating factors: Caffeine withdrawal, Menstrual periods, Odors, Stress, Missed meal, and Dehydration  Aggravating factors: Bending over  Home treatment: Naproxen, Goodies/Excedrin, Tylenol, and Advil with some improvement.   ER visits: No  Positive Hx of: wakes up x3 per night, snoring (mild NORA on HST in Aug 2024)  Is medication overuse contributing to the patient's current migraine burden: No    Headache Medication history:  AED Neuromodulators  MAOIs  Ergot Alkaloids    Acetazolamide (Diamox) [] Phenelzine (Nardil) [] Dihydroergotamine (Migranal) []   Carbamazepine (Tegretol) [] Tranylcypromine (Parnate) [] Ergotamine (Ergomar) []   Gabapentin (Neurontin) [] Antihistamine/Serotonergic  Triptans    Lacosamide (Vimpat) [] Cyproheptadine (Periactin) [] Almotriptan (Axert) []   Lamotrigine (Lamictal) [] Antihypertensives  Eletriptan (Relpax) []   Levatiracetam (Keppra) []  Atenolol (Tenormin) [] Frovatriptan (Frova) []   Oxcarbazepine (Trileptal) [] Bisoprostol (Zebeta) [] Naratriptan (Amerge) []   Levetiracetam (Keppra) [] Candesartan (Atacand) [] Rizatriptan (Maxalt) []   Pregabalin (Lyrica) [] Carvedilol (Coreg)  Sumatriptan (Imitrex) []   Topiramate (Topamax)  (Trokendi) [] Diltiazem (Cardizem) [] Zolmitriptan (Zomig) []   Valproic Acid (Depakote) (Divalproex Sodium) [] Lisinopril (Prinivil, Zestril) [] Combo Abortives    Zonisamide (Zonegran) [] Metoprolol (Toprol) [] BC Powder    Muscle relaxants  Nadolol (Corgard) [] Butalbital and Acetaminophen (Bupap) []   Methocarbamol (Robaxin) [] Nebivolol (Bystolic) [] Butalbital, Acetaminophen, and caffeine (Fioricet) []   Baclofen (Lioresal) [] Nicardipine (Cardene) []     Cyclobenzaprine (Flexeril) [] Nimodipine (Nimotop) [] Butalbital, Aspirin, and caffeine (Fiorinal) []   Tizanidine (Zanaflex) [] Propranolol (Inderal) []     Benzodiazepines  Telmisartan (Micardis) [] Butalbital, Caffeine, Acetaminophen, and Codeine (Fioricet with Codeine) []   Clonazepam (Klonopin) [] Timolol (Blocadren) []     Alprazolam (Xanax) [] Verapamil (Calan, Verelan) [] Butalbital, Caffeine, Aspirin, and Codeine  (Fiorinal with Codeine) []   Diazepam (Valium) [] NSAIDs      Lorazepam (Ativan) [] Acetaminophen (Tylenol) [x]     Antidepressants   Acetylsalicylic Acid (Aspirin) [] Aspirin, Caffeine, and Acetaminophen (Excedrin) (Goodys) [x]   Amitriptyline (Elavil) [] Celecoxib (Celebrex, ElYXYB) []     Bupropion (Wellbutrin) [] Diclofenac (Cambia) []     Citalopram (Celexa) [] Ibuprofen (Motrin, Advil) [x] Acetaminophen, Caffeine, Pyrilamine maleate (Midol) []   Desipramine (Norpramin) [] Indomethacin (Indocin) []     Desvenlafazine (Pristiq) [] Ketoprofen (Orudis) [] Acetaminophen, Dichloralphenazone, and Isometheptene (Midrin) []   Doxepin (Sinequan) [] Ketorolac (Toradol, SPRIX) []     Duloxetine (Cymbalta) [] Naproxen (Anaprox, Aleve) [x]     Escitalopram  (Lexapro) [] Meclofenamic Acid (Meclomen) [] Procedures    Fluoxetine (Prozac) [] Meloxicam (Mobic) [] Greater occipital nerve block []   Imipramine (Tofranil) [] Monoclonals  Cervical radiofrequency ablation []   Nortriptyline (Pamelor) [] Erenumab-aooe (Aimovig) [] Spenopalatine ganglion block []   Protriptyline (Vivactil) [] Galcanezumab (Emgality) [] Occipital neuro stimulation []   Trazodone (Desyrel, Oleptro) [] Fremanazumab-vfrm (Ajovy) [] Cervical Epidural steroid injection []   Venlafaxine (Effexor) [] Eptinezumab (Vyepti) [] Facet joint injections []   Oral CGRP inhibitors  Neuromodulation devices   Transforaminal epidural steroid injection []   Atogepant (Qulipta) [] Cefaly [] Cervical medial branch blocks []   Rimegepant (Nurtec) [] Gamma Core [] Botox []   Ubrogepant (Ubrelvy) [] Nerivio [] iovera []   Zavegepant (Zavzpret) [] Transcranial Magnetic stimulation [] Antiemetics    Other    Prochlorperazine (Compazine) []   Memantine (Namenda) []   Metoclopramide (Reglan)  []   Magnesium Oxide []   Promethazine (Phenergan)  []   Riboflavin (B2) []   Ondansetron (Zofran) []   Co Enzyme Q10 []   Meclizine (Antivert, Dramamine) []     Review of Systems:  Review of Systems   HENT:  Negative for congestion, sinus pain and tinnitus.    Eyes:  Positive for blurred vision and photophobia. Negative for double vision.   Gastrointestinal:  Positive for nausea. Negative for vomiting.   Musculoskeletal:  Negative for falls and neck pain.   Neurological:  Positive for dizziness and headaches. Negative for sensory change and focal weakness.   Psychiatric/Behavioral:  The patient does not have insomnia.    All other systems reviewed and are negative.       Past Medical History:  -------------------------------------    Arthritis    knees  uses cane for long distances    Cervical polyp    Wears prescription eyeglasses       Allergies:  Review of patient's allergies indicates:   Allergen Reactions    House dust Rash and  Shortness Of Breath    Flowers Itching and Rash       Pertinent Family History:  Family History   Problem Relation Name Age of Onset    Breast cancer Mother Kimberly Zelaya     Arthritis Mother Kimberly Zelaya     Cancer Mother Kimberly Zelaya     Diabetes Mother Kimberly Zelaya     Colon cancer Neg Hx      Ovarian cancer Neg Hx         Pertinent Social History:  Social History[1]    Medications:  Current Outpatient Medications   Medication Instructions    acetaminophen (TYLENOL) 1,000 mg, Oral, Every 6 hours PRN    ergocalciferol (ERGOCALCIFEROL) 50,000 Units, Oral, Every 30 days    ferrous sulfate (FEOSOL) 325 mg, Oral, With breakfast    meloxicam (MOBIC) 7.5 mg, Oral    sulfacetamide sodium-sulfur 10-5 % (w/w) Clsr Apply topically.    UNABLE TO FIND Apply topically. medication name: Azelaic Acid 15% Metronidazole 1% Ivermectin 1% cream        Objective:     Vitals:    07/24/25 0820   BP: 137/83   Pulse: 71        General:  Well-appearing, well-nourished, NAD, cooperative  MSK: no TTP on occipital, cervical paraspinal muscles or traps    Neurologic Exam:   Awake, alert and oriented x3  Speech spontaneous and fluent, intact comprehension.   Adequate fund of knowledge, vocabulary.    CN II - CN XII:  PERRLA. EOM intact. No Nystagmus. No ophthalmoplegia.   Facial sensation is normal to light touch.   Facial expression is full and symmetric.   Hearing is intact bilaterally.   Palate elevates symmetrically.   SCM and Trapezius full strength bilaterally.   Tongue is midline.     Motor:  Normal bulk and tone in all four limbs.   There are no atrophy or fasciculations. No tremor.     Shoulder  Abd Shoulder Add Elbow   Flex Elbow  Ext Wrist   Flex Wrist  Ext Finger  Flex Finger  Ext Finger  Abd Finger   Add IO Opposition   Right 5 5 5 5       5    Left 5 5 5 5       5       Hip  Flex Hip  Ext Thigh   Abd Thigh  Add Knee  Flex Knee  Ext Plantar  Flex Dorsiflex   Right 5 5   5 5 5 5   Left 5 5   5 5 5 5     Sensory:  Light  touch: normal tactile sense throughout  Proprioception: proprioceptive sense present on RUE and on LUE  Romberg is Exam: negative    DTRs:   Biceps Brachioradialis Triceps Alex Patellar Ankle Plantar   Right 2+ 2+  - 2+     Left 2+ 2+  - 2+       Coordination:  Finger to nose is normal bilaterally.  Normal fine finger movements and rapid alternating movements.    Gait:  Normal casual and tandem gait.    Pertinent lab results  Lab Results   Component Value Date    [id number] 25 (L) 05/30/2024     Lab Results   Component Value Date    SEDRATE 30 (H) 05/30/2024     Lab Results   Component Value Date    [id number] Non-reactive 05/30/2024     Lab Results   Component Value Date    TSH 0.914 05/06/2025    FREET4 0.90 05/30/2024    WBC 10.80 06/23/2025    LYMPH 21.9 06/23/2025    LYMPH 2.37 06/23/2025    RBC 4.51 06/23/2025    HGB 13.4 06/23/2025    HCT 40.9 06/23/2025    MCV 91 06/23/2025     06/23/2025     06/23/2025    K 4.5 06/23/2025    CO2 25 06/23/2025    BUN 12 06/23/2025    CREATININE 0.8 06/23/2025    CALCIUM 9.1 06/23/2025    AST 10 05/30/2024    ALT 12 05/30/2024       Pertinent imaging results  *No relevant imaging available to review     Other pertinent studies  None    Assessment:   Tona Zelaya is a 44 y.o. RH female with no significant PMHx who presents for evaluation of recent onset worsening headaches. Characteristics meet the criteria for episodic migraines without aura. She has some postural component at nights therefore IIH and/or sleep apnea may be contributing. For rescue, we will do a trial of Amerge (Naratriptan), she denies history of cardiac disease in self. Lastly, we will obtain imaging of the brain to exclude intracranial pathologies given the change in headaches, thorough neurological exam is reassuring however. I'd also recommend following up with ophthalmology for eye exam.     1. Intractable migraine without aura and without status migrainosus    2.  Worsening headaches    3. Postural headache    4. Blurry vision, bilateral      Plan:     - MRI Brain W WO Contrast; Future  - LORazepam (ATIVAN) 1 MG tablet; Please take one tablet 30 minutes prior to MRI, you may take a second dose immediately before the MRI if needed. Do not drive after taking this medication.  Dispense: 2 tablet; Refill: 0  - MRI Brain W WO Contrast  - Follow up: VV in 6 months to reassess      Disclaimer: This note was partly generated using dictation software which may occasionally result in transcription errors that are missed on review.      Based on our encounter today, my overall Medical Decision Making is a Level 4     Complexity of Problem: Moderate (1 or more chronic illnesses with exacerbation, progression or treatment side effects)  Complexity of Data: Moderate (Review of >3 unique test results and Ordering each unique test)  Risk of Complications and/or morbidity/mortality of Management: Moderate risk (Prescription drug management)        Aliza Bhandari MD    [facility]  07/24/2025          [1]   Social History  Tobacco Use    Smoking status: Never     Passive exposure: Never    Smokeless tobacco: Never   Vaping Use    Vaping status: Never Used   Substance Use Topics    Alcohol use: Never    Drug use: Never

## 2025-07-25 ENCOUNTER — OFFICE VISIT (OUTPATIENT)
Dept: ORTHOPEDICS | Facility: CLINIC | Age: 45
End: 2025-07-25
Payer: COMMERCIAL

## 2025-07-25 VITALS
HEIGHT: 64 IN | WEIGHT: 293 LBS | BODY MASS INDEX: 50.02 KG/M2 | HEART RATE: 101 BPM | DIASTOLIC BLOOD PRESSURE: 90 MMHG | SYSTOLIC BLOOD PRESSURE: 133 MMHG

## 2025-07-25 DIAGNOSIS — M17.12 PRIMARY OSTEOARTHRITIS OF LEFT KNEE: Primary | ICD-10-CM

## 2025-07-25 PROCEDURE — 99999 PR PBB SHADOW E&M-EST. PATIENT-LVL III: CPT | Mod: PBBFAC,,,

## 2025-07-25 RX ORDER — TRIAMCINOLONE ACETONIDE 40 MG/ML
40 INJECTION, SUSPENSION INTRA-ARTICULAR; INTRAMUSCULAR
Status: DISCONTINUED | OUTPATIENT
Start: 2025-07-25 | End: 2025-07-25 | Stop reason: HOSPADM

## 2025-07-25 RX ORDER — TRIAMCINOLONE ACETONIDE 40 MG/ML
40 INJECTION, SUSPENSION INTRA-ARTICULAR; INTRAMUSCULAR
Status: COMPLETED | OUTPATIENT
Start: 2025-07-25 | End: 2025-07-25

## 2025-07-25 RX ADMIN — TRIAMCINOLONE ACETONIDE 40 MG: 40 INJECTION, SUSPENSION INTRA-ARTICULAR; INTRAMUSCULAR at 08:07

## 2025-07-25 NOTE — PROCEDURES
Large Joint Aspiration/Injection: L knee    Date/Time: 7/25/2025 8:00 AM    Performed by: Veronica Lucio PA-C  Authorized by: Veronica Lucio PA-C    Site marked: the procedure site was marked    Timeout: prior to procedure the correct patient, procedure, and site was verified    Prep: patient was prepped and draped in usual sterile fashion      Local anesthesia used?: Yes    Local anesthetic:  Bupivacaine 0.25% without epinephrine and topical anesthetic  Anesthetic total (ml):  4      Details:  Needle Size:  21 G  Ultrasonic Guidance for needle placement?: No    Approach:  Anterolateral  Location:  Knee  Site:  L knee  Medications:  40 mg triamcinolone acetonide 40 mg/mL  Patient tolerance:  Patient tolerated the procedure well with no immediate complications

## 2025-07-25 NOTE — PROGRESS NOTES
Patient ID: Tona Zelaya is a 44 y.o. female    Chief Complaint   Patient presents with    Left Knee - Pain     History of Present Illness:    Tona Zelaya presents to clinic for left knee pain. Reports she was doing okay, but last week went to a conference in [place] and thinks she overdid it. States she has posterior knee pain. She is on mobic with improvement. She is wondering if the posterior pain is from arthritis or the meniscus. Pain has been more consistent since returning. Reports she had hysterectomy and had to be off the mobic and noticed her symptoms worsened. No recent injury. BMI 51.16 today. Interested in repeating left knee CSI, did well with previous given about 3 months ago. Right knee is doing okay.    Autoimmune conditions:No    DEXA Scan: N/A    Occupation: teacher    Ambulating:  Unassisted  Diabetic: no  Smoking: no  Hx of DVT/PE: no        PAST MEDICAL HISTORY:   Past Medical History:   Diagnosis Date    Arthritis     knees  uses cane for long distances    Cervical polyp     Wears prescription eyeglasses      PAST SURGICAL HISTORY:   Past Surgical History:   Procedure Laterality Date    CYST REMOVAL Bilateral 6/27/2025    Procedure: EXCISION, CYST, LABIAL INCLUSION CYST BILATERAL;  Surgeon: Danielle Pichardo MD;  Location: [facility] OR;  Service: OB/GYN;  Laterality: Bilateral;    HYSTEROSCOPY WITH DILATION AND CURETTAGE OF UTERUS N/A 6/27/2025    Procedure: HYSTEROSCOPY, WITH DILATION AND CURETTAGE OF UTERUS;  Surgeon: Danielle Pichardo MD;  Location: [facility] OR;  Service: OB/GYN;  Laterality: N/A;    WISDOM TOOTH EXTRACTION       FAMILY HISTORY:   Family History   Problem Relation Name Age of Onset    Breast cancer Mother Kimberly Zelaya     Arthritis Mother Kimberly Zelaya     Cancer Mother Kimberly Zelaya     Diabetes Mother Kimberly Zelaya     Colon cancer Neg Hx      Ovarian cancer Neg Hx       SOCIAL HISTORY:   Social History     Occupational History    Not on  file   Tobacco Use    Smoking status: Never     Passive exposure: Never    Smokeless tobacco: Never   Vaping Use    Vaping status: Never Used   Substance and Sexual Activity    Alcohol use: Never    Drug use: Never    Sexual activity: Never        MEDICATIONS: Current Medications[1]  ALLERGIES:   Review of patient's allergies indicates:   Allergen Reactions    House dust Rash and Shortness Of Breath    Flowers Itching and Rash         Physical Exam     Vitals:    07/25/25 0803   BP: (!) 133/90   Pulse: 101       Alert and oriented to person, place and time. No acute distress. Well-groomed, not ill appearing. Pupils round and reactive, normal respiratory effort, no audible wheezing.     GENERAL:  A well-developed, well-nourished 44 y.o. female who is alert and       oriented in no acute distress.      Gait: She  walks with a normal gait.                   EXTREMITIES:  Examination of lower extremities reveals there is no visible mass or deformity.    Right knee:  ROM 0-110 * +crepitus    Ligamentously stable to varus/valgus stress.    Anterior and posterior drawers negative.    + TTP pes bursa.    No warmth    No erythema    Effusion No    medial joint line tenderness    Positive Patellofemoral grind/crepitus     Left knee:  ROM 0-110 * +crepitus    Ligamentously stable to varus/valgus stress.    Anterior and posterior drawers negative.    No pain over pes bursa.    No warmth    No erythema    Effusion No    medial joint line tenderness    Positive Patellofemoral grind/crepitus     The skin over both lower extremities is normal and unremarkable.  She has a  painless range of motion of the hips and ankles bilaterally.   Sensation is intact in both lower extremities.    There are no motor deficits in the lower extremities bilaterally.   Pedal pulses are palpable distally bilaterally.    She has no calf tenderness to palpation nor edema.    Imaging:     Bilateral Knee X-rays ordered/reviewed by me showing no evidence of  fracture or dislocation. There is no obvious malalignment. No evidence of masses, lesions or foreign bodies. Moderate tricompartmental degenerative changes bilaterally. Kellgren lyle grade 2-3.     Assessment & Plan    Primary osteoarthritis of left knee  -     triamcinolone acetonide injection 40 mg  -     Large Joint Aspiration/Injection: L knee        Patient here for continued left knee pain.  We discussed her x-rays which show moderate arthritis.  I believe this is the cause of her pain.  We did discuss an MRI but deferred today as long term this would not change our treatment plan.  Had an injection about 3 months ago which provided relief however pain has since returned.  She is interested in repeating left knee CSI.  Left knee CSI given, post-injection instructions reviewed.  Discussed iovera treatment as well, she is going to think about it.    Follow up: prn  X-rays next visit: none    All questions were answered and patient is agreeable to the above plan.                        [1]   Current Outpatient Medications:     ergocalciferol (ERGOCALCIFEROL) 50,000 unit Cap, Take 1 capsule (50,000 Units total) by mouth every 30 days., Disp: 3 capsule, Rfl: 3    ferrous sulfate (FEOSOL) 325 mg (65 mg iron) Tab tablet, Take 1 tablet (325 mg total) by mouth daily with breakfast., Disp: 90 tablet, Rfl: 3    LORazepam (ATIVAN) 1 MG tablet, Please take one tablet 30 minutes prior to MRI, you may take a second dose immediately before the MRI if needed. Do not drive after taking this medication., Disp: 2 tablet, Rfl: 0    meloxicam (MOBIC) 7.5 MG tablet, Take 1 tablet by mouth once daily, Disp: 30 tablet, Rfl: 0    naratriptan (AMERGE) 2.5 MG tablet, 2.5 mg at onset of headache, may repeat in 4 hours if needed, Disp: 9 tablet, Rfl: 11    sulfacetamide sodium-sulfur 10-5 % (w/w) Clsr, Apply topically., Disp: , Rfl:     UNABLE TO FIND, Apply topically. medication name: Azelaic Acid 15% Metronidazole 1% Ivermectin 1%  cream, Disp: , Rfl:     acetaminophen (TYLENOL) 500 MG tablet, Take 2 tablets (1,000 mg total) by mouth every 6 (six) hours as needed for Pain. (Patient not taking: Reported on 7/25/2025), Disp: 30 tablet, Rfl: 2  No current facility-administered medications for this visit.

## 2025-08-11 PROBLEM — Z98.890 STATUS POST HYSTEROSCOPY: Status: RESOLVED | Noted: 2025-06-27 | Resolved: 2025-08-11

## 2025-08-12 ENCOUNTER — OFFICE VISIT (OUTPATIENT)
Dept: OBSTETRICS AND GYNECOLOGY | Facility: CLINIC | Age: 45
End: 2025-08-12
Payer: COMMERCIAL

## 2025-08-12 DIAGNOSIS — N93.9 ABNORMAL UTERINE BLEEDING (AUB): Primary | ICD-10-CM

## 2025-08-12 PROCEDURE — 98005 SYNCH AUDIO-VIDEO EST LOW 20: CPT | Mod: 95,,, | Performed by: OBSTETRICS & GYNECOLOGY

## 2025-08-22 DIAGNOSIS — M17.12 PRIMARY OSTEOARTHRITIS OF LEFT KNEE: ICD-10-CM

## 2025-08-22 RX ORDER — MELOXICAM 7.5 MG/1
7.5 TABLET ORAL
Qty: 30 TABLET | Refills: 0 | Status: SHIPPED | OUTPATIENT
Start: 2025-08-22

## 2025-09-03 ENCOUNTER — TELEPHONE (OUTPATIENT)
Dept: ORTHOPEDICS | Facility: CLINIC | Age: 45
End: 2025-09-03
Payer: COMMERCIAL

## 2025-09-04 ENCOUNTER — OFFICE VISIT (OUTPATIENT)
Dept: ORTHOPEDICS | Facility: CLINIC | Age: 45
End: 2025-09-04
Payer: COMMERCIAL

## 2025-09-04 VITALS
WEIGHT: 293 LBS | BODY MASS INDEX: 51.12 KG/M2 | DIASTOLIC BLOOD PRESSURE: 99 MMHG | HEART RATE: 98 BPM | SYSTOLIC BLOOD PRESSURE: 180 MMHG

## 2025-09-04 DIAGNOSIS — M71.22 BAKER'S CYST, LEFT: Primary | ICD-10-CM

## 2025-09-04 PROCEDURE — 3008F BODY MASS INDEX DOCD: CPT | Mod: CPTII,S$GLB,,

## 2025-09-04 PROCEDURE — 3080F DIAST BP >= 90 MM HG: CPT | Mod: CPTII,S$GLB,,

## 2025-09-04 PROCEDURE — 3077F SYST BP >= 140 MM HG: CPT | Mod: CPTII,S$GLB,,

## 2025-09-04 PROCEDURE — 99999 PR PBB SHADOW E&M-EST. PATIENT-LVL III: CPT | Mod: PBBFAC,,,

## 2025-09-04 PROCEDURE — 1159F MED LIST DOCD IN RCRD: CPT | Mod: CPTII,S$GLB,,

## 2025-09-04 PROCEDURE — 1160F RVW MEDS BY RX/DR IN RCRD: CPT | Mod: CPTII,S$GLB,,

## 2025-09-04 PROCEDURE — 99213 OFFICE O/P EST LOW 20 MIN: CPT | Mod: S$GLB,,,

## (undated) DEVICE — PACK FLUENT DISPOSABLE

## (undated) DEVICE — SOL NACL IRR 3000ML

## (undated) DEVICE — SOL POVIDONE PREP IODINE 4 OZ

## (undated) DEVICE — DEVICE MYOSURE REACH SYS

## (undated) DEVICE — SOL POVIDONE SCRUB IODINE 4 OZ

## (undated) DEVICE — GLOVE SENSICARE PI SURG 6

## (undated) DEVICE — Device

## (undated) DEVICE — SEAL LENS SCOPE MYOSURE

## (undated) DEVICE — SOL IRR SOD CHL .9% POUR

## (undated) DEVICE — SET BASIN 48X48IN 6000ML RING